# Patient Record
Sex: FEMALE | Race: WHITE | Employment: UNEMPLOYED | URBAN - NONMETROPOLITAN AREA
[De-identification: names, ages, dates, MRNs, and addresses within clinical notes are randomized per-mention and may not be internally consistent; named-entity substitution may affect disease eponyms.]

---

## 2018-02-19 ENCOUNTER — OFFICE VISIT (OUTPATIENT)
Dept: FAMILY MEDICINE CLINIC | Age: 38
End: 2018-02-19
Payer: COMMERCIAL

## 2018-02-19 VITALS
HEIGHT: 67 IN | BODY MASS INDEX: 24.48 KG/M2 | TEMPERATURE: 98.9 F | DIASTOLIC BLOOD PRESSURE: 60 MMHG | SYSTOLIC BLOOD PRESSURE: 110 MMHG | WEIGHT: 156 LBS

## 2018-02-19 DIAGNOSIS — J45.909 ACUTE ASTHMA: Primary | ICD-10-CM

## 2018-02-19 DIAGNOSIS — R68.89 FLU-LIKE SYMPTOMS: ICD-10-CM

## 2018-02-19 LAB
INFLUENZA A ANTIBODY: NEGATIVE
INFLUENZA B ANTIBODY: NEGATIVE

## 2018-02-19 PROCEDURE — 87804 INFLUENZA ASSAY W/OPTIC: CPT | Performed by: FAMILY MEDICINE

## 2018-02-19 PROCEDURE — 99213 OFFICE O/P EST LOW 20 MIN: CPT | Performed by: FAMILY MEDICINE

## 2018-02-19 RX ORDER — ALBUTEROL SULFATE 90 UG/1
2 AEROSOL, METERED RESPIRATORY (INHALATION) EVERY 6 HOURS PRN
Qty: 1 INHALER | Refills: 0 | Status: SHIPPED | OUTPATIENT
Start: 2018-02-19 | End: 2019-10-23 | Stop reason: SDUPTHER

## 2018-02-19 RX ORDER — FLUTICASONE PROPIONATE 50 MCG
SPRAY, SUSPENSION (ML) NASAL
COMMUNITY
Start: 2017-12-12 | End: 2018-12-24 | Stop reason: SDUPTHER

## 2018-02-19 RX ORDER — AMOXICILLIN AND CLAVULANATE POTASSIUM 875; 125 MG/1; MG/1
1 TABLET, FILM COATED ORAL 2 TIMES DAILY
Qty: 20 TABLET | Refills: 0 | Status: SHIPPED | OUTPATIENT
Start: 2018-02-19 | End: 2018-02-19 | Stop reason: CLARIF

## 2018-02-19 RX ORDER — AMOXICILLIN AND CLAVULANATE POTASSIUM 875; 125 MG/1; MG/1
1 TABLET, FILM COATED ORAL 2 TIMES DAILY
Qty: 20 TABLET | Refills: 0 | Status: SHIPPED | OUTPATIENT
Start: 2018-02-19 | End: 2018-03-01

## 2018-02-19 RX ORDER — M-VIT,TX,IRON,MINS/CALC/FOLIC 27MG-0.4MG
1 TABLET ORAL DAILY
COMMUNITY
End: 2020-08-17 | Stop reason: ALTCHOICE

## 2018-02-19 NOTE — PROGRESS NOTES
Vitamins-Minerals (THERAPEUTIC MULTIVITAMIN-MINERALS) tablet Take 1 tablet by mouth daily      CRANBERRY PO Take by mouth      IRON PO Take by mouth      amoxicillin-clavulanate (AUGMENTIN) 875-125 MG per tablet Take 1 tablet by mouth 2 times daily for 10 days 20 tablet 0    albuterol sulfate HFA (PROAIR HFA) 108 (90 Base) MCG/ACT inhaler Inhale 2 puffs into the lungs every 6 hours as needed for Wheezing 1 Inhaler 0     No current facility-administered medications for this visit. Allergies   Allergen Reactions    Bee Venom     Dust Mite Extract     Other      grass         Physical Exam    /60   Temp 98.9 °F (37.2 °C)   Ht 5' 7\" (1.702 m)   Wt 156 lb (70.8 kg)    L/min Comment: 402  BMI 24.43 kg/m²   Wt Readings from Last 3 Encounters:   02/19/18 156 lb (70.8 kg)     BP Readings from Last 3 Encounters:   02/19/18 110/60       General Appearance: well-developed and well nourished and in no acute distress  Skin: warm and dry, no rash or erythema  Eyes: pupils equal, round, and reactive to light  Ears: bilateral tympanic membrane normal  Nose: swollen and obstruction on left with erythema. Throat: 1+ tonsillar size, erythema  Neck: neck supple and non tender without mass, no thyromegaly or thyroid nodules, no cervical lymphadenopathy with the exception of: anterior posterior nodes. Lungs: clear to auscultation bilaterally  Neurologic: alert and oriented, and cooperative for exam.      ASSESSMENT:  1. Acute asthma     2. Flu-like symptoms  POCT Influenza A/B       PLAN:  If her rapid influenza test is negative, I will treat her with an ATB for the sinus findings. I recommend that she cancel her business trip for tomorrow. Her influenza test is negative, I will prescribe augmentin 875 mg twice daily x10 days. The patient is instructed to call the office if she is not improving in a couple days.       Orders Placed This Encounter   Procedures    POCT Influenza A/B     Orders Placed This

## 2018-02-19 NOTE — PATIENT INSTRUCTIONS
PLAN:  If her rapid influenza test is negative, I will treat her with an ATB for the sinus findings. I recommend that she cancel her business trip for tomorrow.

## 2018-07-13 ENCOUNTER — OFFICE VISIT (OUTPATIENT)
Dept: FAMILY MEDICINE CLINIC | Age: 38
End: 2018-07-13
Payer: COMMERCIAL

## 2018-07-13 VITALS
DIASTOLIC BLOOD PRESSURE: 72 MMHG | SYSTOLIC BLOOD PRESSURE: 112 MMHG | BODY MASS INDEX: 23.86 KG/M2 | WEIGHT: 152 LBS | HEIGHT: 67 IN

## 2018-07-13 DIAGNOSIS — L25.9 CONTACT DERMATITIS, UNSPECIFIED CONTACT DERMATITIS TYPE, UNSPECIFIED TRIGGER: Primary | ICD-10-CM

## 2018-07-13 PROCEDURE — 96372 THER/PROPH/DIAG INJ SC/IM: CPT | Performed by: FAMILY MEDICINE

## 2018-07-13 PROCEDURE — 99213 OFFICE O/P EST LOW 20 MIN: CPT | Performed by: FAMILY MEDICINE

## 2018-07-13 RX ORDER — TRIAMCINOLONE ACETONIDE 40 MG/ML
40 INJECTION, SUSPENSION INTRA-ARTICULAR; INTRAMUSCULAR ONCE
Status: COMPLETED | OUTPATIENT
Start: 2018-07-13 | End: 2018-07-13

## 2018-07-13 RX ORDER — LORATADINE 10 MG/1
10 CAPSULE, LIQUID FILLED ORAL DAILY
COMMUNITY

## 2018-07-13 RX ADMIN — TRIAMCINOLONE ACETONIDE 40 MG: 40 INJECTION, SUSPENSION INTRA-ARTICULAR; INTRAMUSCULAR at 16:18

## 2018-07-13 ASSESSMENT — PATIENT HEALTH QUESTIONNAIRE - PHQ9
1. LITTLE INTEREST OR PLEASURE IN DOING THINGS: 0
2. FEELING DOWN, DEPRESSED OR HOPELESS: 0
SUM OF ALL RESPONSES TO PHQ QUESTIONS 1-9: 0
SUM OF ALL RESPONSES TO PHQ9 QUESTIONS 1 & 2: 0

## 2018-07-13 NOTE — PROGRESS NOTES
the lungs every 6 hours as needed for Wheezing 1 Inhaler 0     No current facility-administered medications for this visit. Allergies   Allergen Reactions    Bee Venom     Dust Mite Extract     Other      grass         PHYSICAL EXAM:    /72   Ht 5' 7\" (1.702 m)   Wt 152 lb (68.9 kg)   BMI 23.81 kg/m²   Wt Readings from Last 3 Encounters:   07/13/18 152 lb (68.9 kg)   02/19/18 156 lb (70.8 kg)     BP Readings from Last 3 Encounters:   07/13/18 112/72   02/19/18 110/60       General Appearance: in no acute distress, well developed, well nourished. Eyes: pupils equal, round reactive to light and accommodation. Oral Cavity: mucosa moist.  Neck/Thyroid: neck supple, full range of motion  Skin: warm and dry. Periorbital region left side laterally, papular lesions on temporal, abdomen, and back. Musculoskeletal: normal, full range of motion in knees and hips. Psych: normal affect, speech fluent. ASSESSMENT:   Diagnosis Orders   1. Contact dermatitis, unspecified contact dermatitis type, unspecified trigger  triamcinolone acetonide (KENALOG-40) injection 40 mg    [place] INJECTION,THERAP/PROPH/DIAGNOST, IM OR SUBCUT       PLAN:  This is classic contact dermatitis. I will give her a kenalog injection. This will offer immediate relief for the itching. Orders Placed This Encounter   Procedures    [place] INJECTION,THERAP/PROPH/DIAGNOST, IM OR SUBCUT     Orders Placed This Encounter   Medications    triamcinolone acetonide (KENALOG-40) injection 40 mg         I, Dr. Julienne Salamanca, personally performed the services described in this documentation as scribed by SEAN Dimas in my presence, and it is both accurate and complete.

## 2018-12-24 ENCOUNTER — OFFICE VISIT (OUTPATIENT)
Dept: FAMILY MEDICINE CLINIC | Age: 38
End: 2018-12-24
Payer: COMMERCIAL

## 2018-12-24 VITALS
WEIGHT: 141 LBS | SYSTOLIC BLOOD PRESSURE: 102 MMHG | BODY MASS INDEX: 22.13 KG/M2 | HEIGHT: 67 IN | DIASTOLIC BLOOD PRESSURE: 58 MMHG

## 2018-12-24 DIAGNOSIS — Z00.00 WELL ADULT EXAM: Primary | ICD-10-CM

## 2018-12-24 PROCEDURE — 99395 PREV VISIT EST AGE 18-39: CPT | Performed by: FAMILY MEDICINE

## 2018-12-24 RX ORDER — FLUTICASONE PROPIONATE 50 MCG
1 SPRAY, SUSPENSION (ML) NASAL DAILY
Qty: 1 BOTTLE | Refills: 3 | Status: SHIPPED | OUTPATIENT
Start: 2018-12-24 | End: 2019-10-23 | Stop reason: SDUPTHER

## 2018-12-24 NOTE — PATIENT INSTRUCTIONS
PLAN:  She is taking both a prenatal and multivitamin. There isn't a need to take both of these. I recommend that she just take the one that has folic acid in it since she and her  will be trying to conceive. Her previous cholesterol levels have been great. I don't see a need to re-test this. She looks great. I will sign off on paperwork for her employer and will see her back as needed. No orders of the defined types were placed in this encounter.   I, Dr. Marycarmen Solares, personally performed the services described in this documentation as scribed by SEAN Hancock in my presence, and is both accurate and complete. SURVEY:    You may be receiving a survey from [employer] regarding your visit today. Please complete the survey to enable us to provide the highest quality of care to you and your family. If you cannot score us a very good on any question, please call the office to discuss how we could of made your experience a very good one. Thank you.

## 2018-12-24 NOTE — PROGRESS NOTES
Denies itching. Denies rash. Neurologic: Denies falls. Denies dizziness. Denies fainting. Denies tingling/numbness. Psychiatric: Denies sleep disturbance. Denies anxiety. Denies depressed mood.     Past Surgical History   History reviewed. No pertinent surgical history.        Family History   History reviewed. No pertinent family history.        Past Medical History   History reviewed. No pertinent past medical history. Social History   Substance Use Topics    Smoking status: Never Smoker    Smokeless tobacco: Never Used    Alcohol use Not on file      Current Facility-Administered Medications          Current Outpatient Prescriptions   Medication Sig Dispense Refill    loratadine (CLARITIN) 10 MG capsule Take 10 mg by mouth daily        fluticasone (FLONASE) 50 MCG/ACT nasal spray          Multiple Vitamins-Minerals (THERAPEUTIC MULTIVITAMIN-MINERALS) tablet Take 1 tablet by mouth daily        CRANBERRY PO Take by mouth        IRON PO Take by mouth        albuterol sulfate HFA (PROAIR HFA) 108 (90 Base) MCG/ACT inhaler Inhale 2 puffs into the lungs every 6 hours as needed for Wheezing 1 Inhaler 0      No current facility-administered medications for this visit.                Allergies   Allergen Reactions    Bee Venom      Dust Mite Extract      Other         grass         PHYSICAL EXAM:  Vitals:    12/24/18 0842   BP: (!) 102/58   Weight: 141 lb (64 kg)   Height: 5' 7\" (1.702 m)        Ht 5' 7\" (1.702 m)   BMI 23.81 kg/m²       Wt Readings from Last 3 Encounters:   07/13/18 152 lb (68.9 kg)   02/19/18 156 lb (70.8 kg)          BP Readings from Last 3 Encounters:   07/13/18 112/72   02/19/18 110/60         General Appearance: in no acute distress, well developed, well nourished. Eyes: pupils equal, round reactive to light and accommodation. Ears: normal canal and TM's. Nose: nares patent, no lesions. Oral Cavity: mucosa moist.  Throat: clear.   Neck/Thyroid: neck supple, full range of motion, no cervical lymphadenopathy, no thyromegaly or carotid bruits. Skin: warm and dry. No suspicious lesions. Heart: regular rate and rhythm. No murmurs. S1, S2 normal, no gallops. Lungs: clear to auscultation bilaterally. Abdomen: bowel sounds present, soft, nontender, nondistended, no masses or organomegaly. Musculoskeletal: normal, full range of motion in knees and hips, no swelling or tenderness. Extremities: no cyanosis or edema. Peripheral Pulses: 2+ throughout, symetric. Neurologic: nonfocal, motor strength normal upper and lower extremities, sensory exam intact. Psych: normal affect, speech fluent.     ASSESSMENT:   Diagnosis Orders   1. Well adult exam            PLAN:  She is taking both a prenatal and multivitamin. There isn't a need to take both of these. I recommend that she just take the one that has folic acid in it since she and her  will be trying to conceive. Her previous cholesterol levels have been great. I don't see a need to re-test this. She looks great. I will sign off on paperwork for her employer and will see her back as needed. No orders of the defined types were placed in this encounter.     I, Dr. Brayan Paige, personally performed the services described in this documentation as scribed by SEAN Roland in my presence, and it is both accurate and complete.

## 2018-12-24 NOTE — PROGRESS NOTES
dry skin. Denies itching. Denies rash. Neurologic: Denies falls. Denies dizziness. Denies fainting. Denies tingling/numbness. Psychiatric: Denies sleep disturbance. Denies anxiety. Denies depressed mood. History reviewed. No pertinent surgical history. History reviewed. No pertinent family history. History reviewed. No pertinent past medical history. Social History   Substance Use Topics    Smoking status: Never Smoker    Smokeless tobacco: Never Used    Alcohol use Not on file      Current Outpatient Prescriptions   Medication Sig Dispense Refill    loratadine (CLARITIN) 10 MG capsule Take 10 mg by mouth daily      fluticasone (FLONASE) 50 MCG/ACT nasal spray       Multiple Vitamins-Minerals (THERAPEUTIC MULTIVITAMIN-MINERALS) tablet Take 1 tablet by mouth daily      CRANBERRY PO Take by mouth      IRON PO Take by mouth      albuterol sulfate HFA (PROAIR HFA) 108 (90 Base) MCG/ACT inhaler Inhale 2 puffs into the lungs every 6 hours as needed for Wheezing 1 Inhaler 0     No current facility-administered medications for this visit. Allergies   Allergen Reactions    Bee Venom     Dust Mite Extract     Other      grass       PHYSICAL EXAM:    Ht 5' 7\" (1.702 m)   BMI 23.81 kg/m²   Wt Readings from Last 3 Encounters:   07/13/18 152 lb (68.9 kg)   02/19/18 156 lb (70.8 kg)     BP Readings from Last 3 Encounters:   07/13/18 112/72   02/19/18 110/60       General Appearance: in no acute distress, well developed, well nourished. Eyes: pupils equal, round reactive to light and accommodation. Ears: normal canal and TM's. Nose: nares patent, no lesions. Oral Cavity: mucosa moist.  Throat: clear. Neck/Thyroid: neck supple, full range of motion, no cervical lymphadenopathy, no thyromegaly or carotid bruits. Skin: warm and dry. No suspicious lesions. Heart: regular rate and rhythm. No murmurs. S1, S2 normal, no gallops. Lungs: clear to auscultation bilaterally.   Abdomen: bowel sounds

## 2019-10-23 ENCOUNTER — OFFICE VISIT (OUTPATIENT)
Dept: FAMILY MEDICINE CLINIC | Age: 39
End: 2019-10-23
Payer: COMMERCIAL

## 2019-10-23 VITALS
SYSTOLIC BLOOD PRESSURE: 92 MMHG | HEIGHT: 67 IN | DIASTOLIC BLOOD PRESSURE: 54 MMHG | WEIGHT: 183 LBS | BODY MASS INDEX: 28.72 KG/M2

## 2019-10-23 DIAGNOSIS — Z00.00 ROUTINE GENERAL MEDICAL EXAMINATION AT A HEALTH CARE FACILITY: Primary | ICD-10-CM

## 2019-10-23 DIAGNOSIS — J30.9 ALLERGIC RHINITIS, UNSPECIFIED SEASONALITY, UNSPECIFIED TRIGGER: ICD-10-CM

## 2019-10-23 DIAGNOSIS — J45.20 INTERMITTENT ASTHMA, UNSPECIFIED ASTHMA SEVERITY, UNSPECIFIED WHETHER COMPLICATED: ICD-10-CM

## 2019-10-23 DIAGNOSIS — K21.9 GASTROESOPHAGEAL REFLUX DISEASE, ESOPHAGITIS PRESENCE NOT SPECIFIED: ICD-10-CM

## 2019-10-23 DIAGNOSIS — J45.909 REACTIVE AIRWAY DISEASE WITHOUT COMPLICATION, UNSPECIFIED ASTHMA SEVERITY, UNSPECIFIED WHETHER PERSISTENT: ICD-10-CM

## 2019-10-23 PROCEDURE — 99395 PREV VISIT EST AGE 18-39: CPT | Performed by: FAMILY MEDICINE

## 2019-10-23 RX ORDER — ALBUTEROL SULFATE 90 UG/1
2 AEROSOL, METERED RESPIRATORY (INHALATION) EVERY 6 HOURS PRN
Qty: 1 INHALER | Refills: 0 | Status: SHIPPED | OUTPATIENT
Start: 2019-10-23

## 2019-10-23 RX ORDER — FLUTICASONE PROPIONATE 50 MCG
1 SPRAY, SUSPENSION (ML) NASAL DAILY
Qty: 1 BOTTLE | Refills: 3 | Status: SHIPPED | OUTPATIENT
Start: 2019-10-23 | End: 2020-06-26

## 2019-10-23 RX ORDER — ESOMEPRAZOLE MAGNESIUM 20 MG/1
20 FOR SUSPENSION ORAL DAILY
COMMUNITY
End: 2020-08-17 | Stop reason: ALTCHOICE

## 2019-10-23 ASSESSMENT — PATIENT HEALTH QUESTIONNAIRE - PHQ9
2. FEELING DOWN, DEPRESSED OR HOPELESS: 0
SUM OF ALL RESPONSES TO PHQ QUESTIONS 1-9: 0
SUM OF ALL RESPONSES TO PHQ QUESTIONS 1-9: 0
1. LITTLE INTEREST OR PLEASURE IN DOING THINGS: 0
SUM OF ALL RESPONSES TO PHQ9 QUESTIONS 1 & 2: 0

## 2019-11-22 PROBLEM — Z00.00 ROUTINE GENERAL MEDICAL EXAMINATION AT A HEALTH CARE FACILITY: Status: RESOLVED | Noted: 2019-10-23 | Resolved: 2019-11-22

## 2020-06-26 RX ORDER — FLUTICASONE PROPIONATE 50 MCG
SPRAY, SUSPENSION (ML) NASAL
Qty: 1 BOTTLE | Refills: 2 | Status: SHIPPED | OUTPATIENT
Start: 2020-06-26 | End: 2020-08-17 | Stop reason: SDUPTHER

## 2020-08-17 ENCOUNTER — HOSPITAL ENCOUNTER (OUTPATIENT)
Dept: LAB | Age: 40
Setting detail: SPECIMEN
Discharge: HOME | End: 2020-08-17
Payer: COMMERCIAL

## 2020-08-17 ENCOUNTER — OFFICE VISIT (OUTPATIENT)
Dept: FAMILY MEDICINE CLINIC | Age: 40
End: 2020-08-17
Payer: COMMERCIAL

## 2020-08-17 VITALS
SYSTOLIC BLOOD PRESSURE: 122 MMHG | WEIGHT: 148 LBS | BODY MASS INDEX: 23.23 KG/M2 | TEMPERATURE: 99.2 F | HEIGHT: 67 IN | DIASTOLIC BLOOD PRESSURE: 70 MMHG

## 2020-08-17 PROCEDURE — C9803 HOPD COVID-19 SPEC COLLECT: HCPCS

## 2020-08-17 PROCEDURE — 99213 OFFICE O/P EST LOW 20 MIN: CPT | Performed by: FAMILY MEDICINE

## 2020-08-17 PROCEDURE — U0003 INFECTIOUS AGENT DETECTION BY NUCLEIC ACID (DNA OR RNA); SEVERE ACUTE RESPIRATORY SYNDROME CORONAVIRUS 2 (SARS-COV-2) (CORONAVIRUS DISEASE [COVID-19]), AMPLIFIED PROBE TECHNIQUE, MAKING USE OF HIGH THROUGHPUT TECHNOLOGIES AS DESCRIBED BY CMS-2020-01-R: HCPCS

## 2020-08-17 RX ORDER — FLUTICASONE PROPIONATE 50 MCG
SPRAY, SUSPENSION (ML) NASAL
Qty: 1 BOTTLE | Refills: 2 | Status: SHIPPED | OUTPATIENT
Start: 2020-08-17 | End: 2021-05-18 | Stop reason: SDUPTHER

## 2020-08-17 RX ORDER — AZITHROMYCIN 250 MG/1
250 TABLET, FILM COATED ORAL DAILY
Qty: 1 PACKET | Refills: 0 | Status: SHIPPED | OUTPATIENT
Start: 2020-08-17 | End: 2020-08-22

## 2020-08-17 ASSESSMENT — PATIENT HEALTH QUESTIONNAIRE - PHQ9
2. FEELING DOWN, DEPRESSED OR HOPELESS: 0
SUM OF ALL RESPONSES TO PHQ QUESTIONS 1-9: 0
1. LITTLE INTEREST OR PLEASURE IN DOING THINGS: 0
SUM OF ALL RESPONSES TO PHQ QUESTIONS 1-9: 0
SUM OF ALL RESPONSES TO PHQ9 QUESTIONS 1 & 2: 0

## 2020-08-17 NOTE — PROGRESS NOTES
I, Clement Mortimer, NIVIA, am scribing for and in the presence of Dr. Raul Keene. 8/17/20/1:29 pm/JML      [address] [address]  [place] [facility]  Dept: [phone]    HPI:  Sore throat and cough x 3 days  No fever  Temp now is 99.2 - had cold medicine with a pain reliever in it about 1 hour ago    Current Outpatient Medications   Medication Sig Dispense Refill    azithromycin (ZITHROMAX Z-IKE) 250 MG tablet Take 1 tablet by mouth daily for 5 days As directed per packet instructions. 1 packet 0    fluticasone (FLONASE) 50 MCG/ACT nasal spray SPRAY ONE SPRAY IN EACH NOSTRIL ONCE DAILY 1 Bottle 2    albuterol sulfate HFA (PROAIR HFA) 108 (90 Base) MCG/ACT inhaler Inhale 2 puffs into the lungs every 6 hours as needed for Wheezing 1 Inhaler 0    aluminum chloride (DRYSOL) 20 % external solution Apply topically nightly. 1 Bottle 2    Prenatal Vit-Fe Fumarate-FA (PRENATAL 1+1 PO) Take 1 tablet by mouth daily      loratadine (CLARITIN) 10 MG capsule Take 10 mg by mouth daily      CRANBERRY PO Take by mouth      IRON PO Take by mouth       No current facility-administered medications for this visit. ROS:  Admits cough  Admits sore throat  Denies fever  Denies loss of smell or taste    EXAM:  /70   Temp 99.2 °F (37.3 °C)   Ht 5' 7\" (1.702 m)   Wt 148 lb (67.1 kg)   BMI 23.18 kg/m²   Wt Readings from Last 3 Encounters:   08/17/20 148 lb (67.1 kg)   10/23/19 183 lb (83 kg)   12/24/18 141 lb (64 kg)     BP Readings from Last 3 Encounters:   08/17/20 122/70   10/23/19 (!) 92/54   12/24/18 (!) 102/58     General Appearance: in no acute distress, well developed, well nourished. Eyes: pupils equal, round reactive to light and accommodation. Ears: normal canal and TM's. Nose: nares patent, no lesions. Oral Cavity: mucosa moist.  Throat:  Single exudate on left side  Neck/Thyroid: posterior nodes bilaterally  Skin: warm and dry.  No suspicious lesions. Heart: regular rate and rhythm. No murmurs. S1, S2 normal, no gallops. Lungs: clear to auscultation bilaterally. Abdomen: bowel sounds present, soft, nontender, nondistended, no masses or organomegaly. Musculoskeletal: normal, full range of motion in knees and hips, no swelling or tenderness. Extremities: no cyanosis or edema. Peripheral Pulses: 2+ throughout, symetric. Neurologic: nonfocal, motor strength normal upper and lower extremities, sensory exam intact. Psych: normal affect, speech fluent. ASSESSMENT:   Diagnosis Orders   1. Cough  COVID-19 Ambulatory    azithromycin (ZITHROMAX Z-IKE) 250 MG tablet   2. Sore throat  COVID-19 Ambulatory    azithromycin (ZITHROMAX Z-IKE) 250 MG tablet   3. Allergic rhinitis, unspecified seasonality, unspecified trigger  fluticasone (FLONASE) 50 MCG/ACT nasal spray   4. Viral URI           PLAN:  This is likely viral  It could be COVID, I recommend that she be very cautious around anyone who may be high risk   I will order the COVID testing today  I will also give her a Zpak d/t the exudates I see in her throat    Orders Placed This Encounter   Procedures    COVID-19 Ambulatory     Standing Status:   Future     Number of Occurrences:   1     Standing Expiration Date:   8/17/2021     Scheduling Instructions:      Saline media preferred given current shortage of viral transport media but both acceptable     Order Specific Question:   Is this test for diagnosis or screening? Answer:   Diagnosis of ill patient     Order Specific Question:   Symptomatic for COVID-19 as defined by CDC? Answer:   Yes     Order Specific Question:   Date of Symptom Onset     Answer:   8/15/2020     Order Specific Question:   Hospitalized for COVID-19? Answer:   No     Order Specific Question:   Admitted to ICU for COVID-19? Answer:   No     Order Specific Question:   Employed in healthcare setting?      Answer:   No     Order Specific Question:   Resident in a congregate (group) care setting? Answer:   No     Order Specific Question:   Pregnant? Answer:   No     Order Specific Question:   Previously tested for COVID-19? Answer:   No     Orders Placed This Encounter   Medications    azithromycin (ZITHROMAX Z-IKE) 250 MG tablet     Sig: Take 1 tablet by mouth daily for 5 days As directed per packet instructions. Dispense:  1 packet     Refill:  0    fluticasone (FLONASE) 50 MCG/ACT nasal spray     Sig: SPRAY ONE SPRAY IN EACH NOSTRIL ONCE DAILY     Dispense:  1 Bottle     Refill:  2     I, Dr. Fina Preston, personally performed the services described in this documentation as scribed by IMELDA Wallace in my presence, and is both accurate and complete.

## 2020-08-17 NOTE — PATIENT INSTRUCTIONS
This is likely viral  It could be COVID, I recommend that she be very cautious around anyone who may be high risk   I will order the COVID testing today  I will also give her a Zpak d/t the exudates I see in her throat      SURVEY:    You may be receiving a survey from [employer] regarding your visit today. Please complete the survey to enable us to provide the highest quality of care to you and your family. If you cannot score us a very good on any question, please call the office to discuss how we could of made your experience a very good one. Thank you.

## 2020-08-20 LAB — SARS-COV-2, NAA: NOT DETECTED

## 2020-09-08 ENCOUNTER — OFFICE VISIT (OUTPATIENT)
Dept: FAMILY MEDICINE CLINIC | Age: 40
End: 2020-09-08
Payer: COMMERCIAL

## 2020-09-08 VITALS
SYSTOLIC BLOOD PRESSURE: 105 MMHG | HEIGHT: 67 IN | DIASTOLIC BLOOD PRESSURE: 69 MMHG | BODY MASS INDEX: 21.03 KG/M2 | WEIGHT: 134 LBS

## 2020-09-08 PROCEDURE — 99395 PREV VISIT EST AGE 18-39: CPT | Performed by: NURSE PRACTITIONER

## 2020-09-08 PROCEDURE — 90471 IMMUNIZATION ADMIN: CPT | Performed by: NURSE PRACTITIONER

## 2020-09-08 PROCEDURE — 90732 PPSV23 VACC 2 YRS+ SUBQ/IM: CPT | Performed by: NURSE PRACTITIONER

## 2020-09-08 ASSESSMENT — ENCOUNTER SYMPTOMS
SINUS PAIN: 0
SINUS PRESSURE: 1
CONSTIPATION: 0
CHEST TIGHTNESS: 0
SORE THROAT: 0
RHINORRHEA: 1
COUGH: 1
DIARRHEA: 0
ABDOMINAL PAIN: 0
EYE PAIN: 0
EYE ITCHING: 1
WHEEZING: 0
SHORTNESS OF BREATH: 0

## 2020-09-08 ASSESSMENT — PATIENT HEALTH QUESTIONNAIRE - PHQ9
SUM OF ALL RESPONSES TO PHQ QUESTIONS 1-9: 0
SUM OF ALL RESPONSES TO PHQ9 QUESTIONS 1 & 2: 0
SUM OF ALL RESPONSES TO PHQ QUESTIONS 1-9: 0
1. LITTLE INTEREST OR PLEASURE IN DOING THINGS: 0
2. FEELING DOWN, DEPRESSED OR HOPELESS: 0

## 2020-09-08 NOTE — PATIENT INSTRUCTIONS
Complete labs within the next few days, if possible. Follow up with office regarding biopsy of skin lesion. Recommended flu vaccination in the fall. Continue follow up with gynecology for routine paps.

## 2020-09-08 NOTE — PROGRESS NOTES
Cardiovascular: Negative for chest pain and palpitations. Gastrointestinal: Negative for abdominal pain, constipation and diarrhea. Genitourinary: Negative for difficulty urinating. Musculoskeletal: Negative for arthralgias, joint swelling and myalgias. Skin: Negative for rash. Neurological: Positive for light-headedness (when she does not have water). Negative for dizziness and headaches. Physical Exam:   Vitals:  /69   Ht 5' 7\" (1.702 m)   Wt 134 lb (60.8 kg)   BMI 20.99 kg/m²     Physical Exam  Constitutional:       General: She is not in acute distress. Appearance: Normal appearance. She is normal weight. She is not ill-appearing or toxic-appearing. HENT:      Head: Normocephalic. Right Ear: Tympanic membrane, ear canal and external ear normal.      Left Ear: Tympanic membrane, ear canal and external ear normal.      Nose: Nose normal. No congestion or rhinorrhea. Mouth/Throat:      Pharynx: No oropharyngeal exudate or posterior oropharyngeal erythema. Comments: Left tonsil 2+, right tonsil 1+. No erythema or exudate. Eyes:      General:         Right eye: No discharge. Left eye: No discharge. Extraocular Movements: Extraocular movements intact. Conjunctiva/sclera: Conjunctivae normal.      Pupils: Pupils are equal, round, and reactive to light. Neck:      Musculoskeletal: Neck supple. Cardiovascular:      Rate and Rhythm: Normal rate and regular rhythm. Heart sounds: Normal heart sounds. No murmur. Pulmonary:      Effort: Pulmonary effort is normal. No respiratory distress. Breath sounds: Normal breath sounds. No stridor. No wheezing, rhonchi or rales. Abdominal:      General: Abdomen is flat. Bowel sounds are normal. There is no distension. Palpations: Abdomen is soft. There is no mass. Tenderness: There is no abdominal tenderness. There is no guarding. Hernia: No hernia is present.    Musculoskeletal:      Right Encouraged biopsy to rule out malignancy. Patient will speak with her  and follow up/schedule an appointment for procedure. Completed Refills   Requested Prescriptions      No prescriptions requested or ordered in this encounter       Orders Placed This Encounter   Procedures    Pneumococcal polysaccharide vaccine 23-valent greater than or equal to 3yo subcutaneous/IM    CBC     Standing Status:   Future     Standing Expiration Date:   9/8/2021    Basic Metabolic Panel     Standing Status:   Future     Standing Expiration Date:   9/8/2021    Lipid Panel     Standing Status:   Future     Standing Expiration Date:   9/8/2021     Order Specific Question:   Is Patient Fasting?/# of Hours     Answer:   fasting    Hemoglobin A1C     Standing Status:   Future     Standing Expiration Date:   9/8/2021        No results found for this visit on 09/08/20. Return in about 1 year (around 9/8/2021), or if symptoms worsen or fail to improve, for annual wellness. Labs to be completed now.  .    Electronically signed by OTDD Perez CNP on 09/08/20 at 9:12 PM.

## 2020-09-11 ENCOUNTER — HOSPITAL ENCOUNTER (OUTPATIENT)
Age: 40
Discharge: HOME | End: 2020-09-11
Payer: COMMERCIAL

## 2020-09-11 LAB
ANION GAP SERPL CALCULATED.3IONS-SCNC: 10 MMOL/L (ref 9–17)
BUN BLDV-MCNC: 11 MG/DL (ref 6–20)
BUN/CREAT BLD: 15 (ref 9–20)
CALCIUM SERPL-MCNC: 9.3 MG/DL (ref 8.6–10.4)
CHLORIDE BLD-SCNC: 106 MMOL/L (ref 98–107)
CHOLESTEROL/HDL RATIO: 3
CHOLESTEROL: 236 MG/DL
CO2: 26 MMOL/L (ref 20–31)
CREAT SERPL-MCNC: 0.71 MG/DL (ref 0.5–0.9)
ESTIMATED AVERAGE GLUCOSE: 114 MG/DL
GFR AFRICAN AMERICAN: >60 ML/MIN
GFR NON-AFRICAN AMERICAN: >60 ML/MIN
GFR SERPL CREATININE-BSD FRML MDRD: NORMAL ML/MIN/{1.73_M2}
GFR SERPL CREATININE-BSD FRML MDRD: NORMAL ML/MIN/{1.73_M2}
GLUCOSE BLD-MCNC: 86 MG/DL (ref 70–99)
HBA1C MFR BLD: 5.6 % (ref 4–6)
HCT VFR BLD CALC: 42.4 % (ref 36.3–47.1)
HDLC SERPL-MCNC: 78 MG/DL
HEMOGLOBIN: 13.5 G/DL (ref 11.9–15.1)
LDL CHOLESTEROL: 142 MG/DL (ref 0–130)
MCH RBC QN AUTO: 29.9 PG (ref 25.2–33.5)
MCHC RBC AUTO-ENTMCNC: 31.8 G/DL (ref 28.4–34.8)
MCV RBC AUTO: 93.8 FL (ref 82.6–102.9)
NRBC AUTOMATED: 0 PER 100 WBC
PDW BLD-RTO: 13.4 % (ref 11.8–14.4)
PLATELET # BLD: 240 K/UL (ref 138–453)
PMV BLD AUTO: 11.1 FL (ref 8.1–13.5)
POTASSIUM SERPL-SCNC: 4 MMOL/L (ref 3.7–5.3)
RBC # BLD: 4.52 M/UL (ref 3.95–5.11)
SODIUM BLD-SCNC: 142 MMOL/L (ref 135–144)
TRIGL SERPL-MCNC: 82 MG/DL
VLDLC SERPL CALC-MCNC: ABNORMAL MG/DL (ref 1–30)
WBC # BLD: 5.6 K/UL (ref 3.5–11.3)

## 2020-09-11 PROCEDURE — 80048 BASIC METABOLIC PNL TOTAL CA: CPT

## 2020-09-11 PROCEDURE — 85027 COMPLETE CBC AUTOMATED: CPT

## 2020-09-11 PROCEDURE — 36415 COLL VENOUS BLD VENIPUNCTURE: CPT

## 2020-09-11 PROCEDURE — 83036 HEMOGLOBIN GLYCOSYLATED A1C: CPT

## 2020-09-11 PROCEDURE — 80061 LIPID PANEL: CPT

## 2020-09-14 ENCOUNTER — OFFICE VISIT (OUTPATIENT)
Dept: FAMILY MEDICINE CLINIC | Age: 40
End: 2020-09-14
Payer: COMMERCIAL

## 2020-09-14 ENCOUNTER — HOSPITAL ENCOUNTER (OUTPATIENT)
Age: 40
Setting detail: SPECIMEN
Discharge: HOME | End: 2020-09-14
Payer: COMMERCIAL

## 2020-09-14 PROCEDURE — 88305 TISSUE EXAM BY PATHOLOGIST: CPT

## 2020-09-14 PROCEDURE — 11301 SHAVE SKIN LESION 0.6-1.0 CM: CPT | Performed by: NURSE PRACTITIONER

## 2020-09-14 NOTE — PROGRESS NOTES
[address] [address]  [place] [address]  Dept: [phone]    EXCISIONAL BIOPSY PROCEDURE NOTE    PRE-OP DIAGNOSIS:  Intradermal Nevus. The lesion has been irritated. PROCEDURE:  Skin Lesion Excision(s)    Lesion description: 0.6mm brown/purple papule present on anterior right chest, medial to right axilla. No surrounding erythema. No bleeding or discharge. INDICATIONS:  Christine Aleman is a 42 y.o. female who presents for minor skin surgery for changing and concerning skin lesion(s). The patient understands all risks, benefits, indications, potential complications, and alternatives, and freely consents for the procedure. The patient also understands the option of performing no surgery, the risk for scarring, and the technique of the procedure. TECHNIQUE:  After informed consent was obtained and after the skin was prepped with Betadine and alcohol, 1.5ml Lidocaine with 2% epinephrine was used as local anesthesia. An incision was made with a #10 blade, the lesion was excised and sent for pathologic evaluation. Bleeding was controlled with ferric subsulfate. A dressing was applied and wound care instructions were provided. she tolerated the procedure well and without complications. The patient will be alert for any signs of cutaneous infection and will follow up as instructed. PLAN:  1. Instructed to keep the wound dry and covered for 24-48h and clean thereafter. 2. Warning signs of infection were reviewed.     3. We will call the patient with the biopsy results

## 2020-09-16 LAB — DERMATOLOGY PATHOLOGY REPORT: NORMAL

## 2021-05-18 ENCOUNTER — OFFICE VISIT (OUTPATIENT)
Dept: FAMILY MEDICINE CLINIC | Age: 41
End: 2021-05-18
Payer: COMMERCIAL

## 2021-05-18 VITALS
DIASTOLIC BLOOD PRESSURE: 70 MMHG | BODY MASS INDEX: 21.5 KG/M2 | WEIGHT: 137 LBS | SYSTOLIC BLOOD PRESSURE: 100 MMHG | HEIGHT: 67 IN | OXYGEN SATURATION: 100 % | HEART RATE: 67 BPM

## 2021-05-18 DIAGNOSIS — J30.9 ALLERGIC RHINITIS, UNSPECIFIED SEASONALITY, UNSPECIFIED TRIGGER: ICD-10-CM

## 2021-05-18 DIAGNOSIS — F32.A DEPRESSION, UNSPECIFIED DEPRESSION TYPE: ICD-10-CM

## 2021-05-18 DIAGNOSIS — Z13.31 POSITIVE DEPRESSION SCREENING: ICD-10-CM

## 2021-05-18 DIAGNOSIS — F41.9 ANXIETY: Primary | ICD-10-CM

## 2021-05-18 PROCEDURE — G8431 POS CLIN DEPRES SCRN F/U DOC: HCPCS | Performed by: NURSE PRACTITIONER

## 2021-05-18 PROCEDURE — 99214 OFFICE O/P EST MOD 30 MIN: CPT | Performed by: NURSE PRACTITIONER

## 2021-05-18 RX ORDER — ESCITALOPRAM OXALATE 10 MG/1
10 TABLET ORAL DAILY
Qty: 30 TABLET | Refills: 2 | Status: SHIPPED
Start: 2021-05-18 | End: 2021-06-10 | Stop reason: ALTCHOICE

## 2021-05-18 RX ORDER — FLUTICASONE PROPIONATE 50 MCG
SPRAY, SUSPENSION (ML) NASAL
Qty: 1 BOTTLE | Refills: 5 | Status: SHIPPED | OUTPATIENT
Start: 2021-05-18 | End: 2021-09-17 | Stop reason: SDUPTHER

## 2021-05-18 ASSESSMENT — PATIENT HEALTH QUESTIONNAIRE - PHQ9
SUM OF ALL RESPONSES TO PHQ QUESTIONS 1-9: 13
3. TROUBLE FALLING OR STAYING ASLEEP: 1
5. POOR APPETITE OR OVEREATING: 0
10. IF YOU CHECKED OFF ANY PROBLEMS, HOW DIFFICULT HAVE THESE PROBLEMS MADE IT FOR YOU TO DO YOUR WORK, TAKE CARE OF THINGS AT HOME, OR GET ALONG WITH OTHER PEOPLE: 1
7. TROUBLE CONCENTRATING ON THINGS, SUCH AS READING THE NEWSPAPER OR WATCHING TELEVISION: 1
6. FEELING BAD ABOUT YOURSELF - OR THAT YOU ARE A FAILURE OR HAVE LET YOURSELF OR YOUR FAMILY DOWN: 1
2. FEELING DOWN, DEPRESSED OR HOPELESS: 3

## 2021-05-18 ASSESSMENT — COLUMBIA-SUICIDE SEVERITY RATING SCALE - C-SSRS: 6. HAVE YOU EVER DONE ANYTHING, STARTED TO DO ANYTHING, OR PREPARED TO DO ANYTHING TO END YOUR LIFE?: NO

## 2021-05-18 NOTE — PROGRESS NOTES
Name: El Johnston  : 1980         Chief Complaint:     Chief Complaint   Patient presents with    Anxiety     PATIENT COMES IN FOR ANXIETY. NOTICIED IT AROUND STEPHANIE TIME, SINCE THAN HAS GOTTEN WORSE. History of Present Illness:      El Johnston is a 47 y.o.  female who presents with Anxiety (PATIENT COMES IN FOR ANXIETY. NOTICIED IT AROUND STEPHANIE TIME, SINCE THAN HAS GOTTEN WORSE. )      HPI   Patient presents with concerns of anxiety and depression. She states that symptoms have become more constant since Stephanie time 2020. 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 She admits working within a group of individuals at her job with variable personality dynamics. She states that she is unable to let things go and her mind is running constantly. She also notes symptoms of feeling fidgety and heart pounding. She cut out caffeine 1-1.5 months ago and symptoms have since mildly improved. She does also note interrupted sleep. She states that she gets so anxious while trying to sleep that she has awoken to watch training videos for her job. Her symptoms are a mix of both anxiety and depression. She states she is a positive person to others but is very negative towards herself. She also notes less motivation. She denies thoughts of hurting herself or others. She has not been diagnosed with anxiety or depression in the past.  She denies previous medication use. Past Medical History:     No past medical history on file.    Reviewed all health maintenance requirements and ordered appropriate tests  Health Maintenance Due   Topic Date Due    Hepatitis C screen  Never done    Varicella vaccine (1 of 2 - 2-dose childhood series) Never done    HIV screen  Never done    Cervical cancer screen  Never done       Past Surgical History:     No past surgical history on file. Medications:       Prior to Admission medications    Medication Sig Start Date End Date Taking? Authorizing Provider   escitalopram (LEXAPRO) 10 MG tablet Take 1 tablet by mouth daily 5/18/21  Yes TODD Prater CNP   fluticasone [facility]) 50 MCG/ACT nasal spray SPRAY ONE SPRAY IN EACH NOSTRIL ONCE DAILY 5/18/21  Yes TODD Prater CNP   albuterol sulfate HFA (PROAIR HFA) 108 (90 Base) MCG/ACT inhaler Inhale 2 puffs into the lungs every 6 hours as needed for Wheezing 10/23/19  Yes Marian Alfredo MD   aluminum chloride (DRYSOL) 20 % external solution Apply topically nightly. 10/23/19  Yes Marian Alfredo MD   loratadine (CLARITIN) 10 MG capsule Take 10 mg by mouth daily   Yes Historical Provider, MD   CRANBERRY PO Take by mouth   Yes Historical Provider, MD   IRON PO Take by mouth   Yes Historical Provider, MD   Prenatal Vit-Fe Fumarate-FA (PRENATAL 1+1 PO) Take 1 tablet by mouth daily  Patient not taking: Reported on 5/18/2021    Historical Provider, MD        Allergies:       Bee venom, Dust mite extract, and Other    Social History:     Tobacco:    reports that she has never smoked. She has never used smokeless tobacco.  Alcohol:      has no history on file for alcohol use. Drug Use:  has no history on file for drug use. Family History:     Family History   Problem Relation Age of Onset    High Blood Pressure Mother     High Cholesterol Mother     High Blood Pressure Father     High Cholesterol Sister        Review of Systems:     Positive and Negative as described in HPI    Review of Systems   Psychiatric/Behavioral: Positive for dysphoric mood and sleep disturbance. Negative for suicidal ideas. The patient is nervous/anxious. Physical Exam:   Vitals:  /70   Pulse 67   Ht 5' 7\" (1.702 m)   Wt 137 lb (62.1 kg)   SpO2 100%   BMI 21.46 kg/m²     Physical Exam  Constitutional:       General: She is not in acute distress.      Appearance: Normal initiate counseling. Contact information given for [id number] ([place]). - Patient agreeable to trial medication. Lexapro 10mg initiated. Reviewed side effects. Discussed this may take upwards of 4-6 weeks to see benefit. - Discussed that thoughts of hurting self or others is a medication emergency and she should present to the ED/call 911 if these occur.   - Encouraged deep breathing, yoga, meditation.  - F/u 3 weeks for medcheck or sooner if concerns arise. Completed Refills   Requested Prescriptions     Signed Prescriptions Disp Refills    escitalopram (LEXAPRO) 10 MG tablet 30 tablet 2     Sig: Take 1 tablet by mouth daily    fluticasone (FLONASE) 50 MCG/ACT nasal spray 1 Bottle 5     Sig: SPRAY ONE SPRAY IN EACH NOSTRIL ONCE DAILY       Orders Placed This Encounter   Procedures    Positive Screen for Clinical Depression with a Documented Follow-up Plan         No results found for this visit on 05/18/21. Return in about 3 weeks (around 6/8/2021), or if symptoms worsen or fail to improve, for anxiety medcheck.     Electronically signed by TODD Aguirre CNP on 05/18/21 at 11:21 AM.

## 2021-06-10 ENCOUNTER — OFFICE VISIT (OUTPATIENT)
Dept: FAMILY MEDICINE CLINIC | Age: 41
End: 2021-06-10
Payer: COMMERCIAL

## 2021-06-10 VITALS
OXYGEN SATURATION: 98 % | HEART RATE: 72 BPM | DIASTOLIC BLOOD PRESSURE: 66 MMHG | RESPIRATION RATE: 17 BRPM | BODY MASS INDEX: 20.72 KG/M2 | HEIGHT: 67 IN | SYSTOLIC BLOOD PRESSURE: 104 MMHG | WEIGHT: 132 LBS

## 2021-06-10 DIAGNOSIS — F41.9 ANXIETY: Primary | ICD-10-CM

## 2021-06-10 DIAGNOSIS — D22.9 BENIGN MOLE: ICD-10-CM

## 2021-06-10 PROCEDURE — 99214 OFFICE O/P EST MOD 30 MIN: CPT | Performed by: NURSE PRACTITIONER

## 2021-06-10 RX ORDER — ESCITALOPRAM OXALATE 20 MG/1
20 TABLET ORAL DAILY
Qty: 30 TABLET | Refills: 2 | Status: SHIPPED | OUTPATIENT
Start: 2021-06-10 | End: 2021-09-16

## 2021-06-10 SDOH — ECONOMIC STABILITY: FOOD INSECURITY: WITHIN THE PAST 12 MONTHS, THE FOOD YOU BOUGHT JUST DIDN'T LAST AND YOU DIDN'T HAVE MONEY TO GET MORE.: NEVER TRUE

## 2021-06-10 SDOH — ECONOMIC STABILITY: FOOD INSECURITY: WITHIN THE PAST 12 MONTHS, YOU WORRIED THAT YOUR FOOD WOULD RUN OUT BEFORE YOU GOT MONEY TO BUY MORE.: NEVER TRUE

## 2021-06-10 ASSESSMENT — SOCIAL DETERMINANTS OF HEALTH (SDOH): HOW HARD IS IT FOR YOU TO PAY FOR THE VERY BASICS LIKE FOOD, HOUSING, MEDICAL CARE, AND HEATING?: NOT HARD AT ALL

## 2021-06-10 NOTE — PATIENT INSTRUCTIONS
SURVEY:    You may be receiving a survey from [email or web address] regarding your visit today. Please complete the survey to enable us to provide the highest quality of care to you and your family. If you cannot score us a very good on any question, please call the office to discuss how we could have made your experience a very good one. Thank you.

## 2021-06-10 NOTE — PROGRESS NOTES
Calvin Kumar MD   Prenatal Vit-Fe Fumarate-FA (PRENATAL 1+1 PO) Take 1 tablet by mouth daily    Yes Historical Provider, MD   loratadine (CLARITIN) 10 MG capsule Take 10 mg by mouth daily   Yes Historical Provider, MD   CRANBERRY PO Take by mouth   Yes Historical Provider, MD   IRON PO Take by mouth   Yes Historical Provider, MD        Allergies:       Bee venom, Dust mite extract, and Other    Social History:     Tobacco:    reports that she has never smoked. She has never used smokeless tobacco.  Alcohol:      has no history on file for alcohol use. Drug Use:  has no history on file for drug use. Family History:     Family History   Problem Relation Age of Onset    High Blood Pressure Mother     High Cholesterol Mother     High Blood Pressure Father     High Cholesterol Sister        Review of Systems:     Positive and Negative as described in HPI    Review of Systems   Skin:        Admits concerns with mole over her left scapular area that recently developed a \"tag\" on top within the last week. She states that her 25month-old child frequently pulls at the lesion. Psychiatric/Behavioral: The patient is nervous/anxious. Physical Exam:   Vitals:  /66 (Site: Left Upper Arm, Position: Sitting, Cuff Size: Large Adult)   Pulse 72   Resp 17   Ht 5' 7\" (1.702 m)   Wt 132 lb (59.9 kg)   SpO2 98%   BMI 20.67 kg/m²     Physical Exam  Constitutional:       General: She is not in acute distress. Appearance: Normal appearance. She is normal weight. She is not ill-appearing or toxic-appearing. HENT:      Head: Normocephalic. Cardiovascular:      Rate and Rhythm: Normal rate and regular rhythm. Heart sounds: Normal heart sounds. No murmur heard. Pulmonary:      Effort: Pulmonary effort is normal. No respiratory distress. Breath sounds: Normal breath sounds. No stridor. No wheezing, rhonchi or rales.    Skin:     Comments: Single, raised, brown papule suggestive of mole with overlying scab present left scapula. Border symmetrical.  No red, blue, or black discoloration. No bleeding. Neurological:      Mental Status: She is alert. Psychiatric:         Mood and Affect: Mood normal.         Behavior: Behavior normal.         Thought Content: Thought content normal.         Judgment: Judgment normal.         Data:     Lab Results   Component Value Date     09/11/2020    K 4.0 09/11/2020     09/11/2020    CO2 26 09/11/2020    BUN 11 09/11/2020    CREATININE 0.71 09/11/2020    GLUCOSE 86 09/11/2020    GLUCOSE 89 01/16/2012    LABALBU 4.4 01/16/2012    BILITOT 0.50 01/16/2012    ALKPHOS 43 01/16/2012    AST 24 01/16/2012    ALT 27 01/16/2012     Lab Results   Component Value Date    WBC 5.6 09/11/2020    RBC 4.52 09/11/2020    RBC 4.20 01/16/2012    HGB 13.5 09/11/2020    HCT 42.4 09/11/2020    MCV 93.8 09/11/2020    MCH 29.9 09/11/2020    MCHC 31.8 09/11/2020    RDW 13.4 09/11/2020     09/11/2020     01/16/2012    MPV 11.1 09/11/2020     Lab Results   Component Value Date    TSH 1.50 01/16/2012     Lab Results   Component Value Date    CHOL 236 09/11/2020    HDL 78 09/11/2020    LABA1C 5.6 09/11/2020       Assessment/Plan:      Diagnosis Orders   1. Anxiety     2. Benign mole         Anxiety:   -Discussed options including remaining on 10 mg Lexapro or increasing to 20 mg. Patient prefers to increase to 20 mg dose. Reviewed side effects of Lexapro at length. Rx sent to pharmacy.  -Notify office if symptoms worsen or persist.  -Follow-up in 4 weeks for medication check. Lesion:  -Discussed that the \"tag\" that she is seeing is an overlying scab on the mole, likely occurring from friction injury.  -Reviewed ABCDE characteristic of cancerous lesions. At this time, lesion does not appear cancerous. We will continue to monitor and consider biopsy if needed.     Completed Refills   Requested Prescriptions     Signed Prescriptions Disp Refills    escitalopram

## 2021-07-21 ENCOUNTER — OFFICE VISIT (OUTPATIENT)
Dept: FAMILY MEDICINE CLINIC | Age: 41
End: 2021-07-21
Payer: COMMERCIAL

## 2021-07-21 VITALS
SYSTOLIC BLOOD PRESSURE: 112 MMHG | WEIGHT: 135 LBS | OXYGEN SATURATION: 98 % | HEART RATE: 91 BPM | BODY MASS INDEX: 21.19 KG/M2 | HEIGHT: 67 IN | DIASTOLIC BLOOD PRESSURE: 70 MMHG

## 2021-07-21 DIAGNOSIS — F41.9 ANXIETY: Primary | ICD-10-CM

## 2021-07-21 PROCEDURE — 99213 OFFICE O/P EST LOW 20 MIN: CPT | Performed by: NURSE PRACTITIONER

## 2021-07-21 NOTE — PROGRESS NOTES
Name: Mark Todd  : 1980         Chief Complaint:     Chief Complaint   Patient presents with    Medication Check     Patient states she is doing well on the Lexapro 20mg. denies neg effects. History of Present Illness:      Mark Todd is a 47 y.o.  female who presents with Medication Check (Patient states she is doing well on the Lexapro 20mg. denies neg effects.)      HPI   The patient presents for 4-week follow-up for anxiety. Her Lexapro was increased from 10 mg to 20 mg 4 weeks ago. Today, she states she is \"feeling good\". She notes an improvement in her symptoms with the recent increase in dosage. She states she feels more comfortable. Side effects included previous GI issues that have since resolved. She does note continued anxiousness but her stress level has decreased overall. She admits having mental health resources at work and was given contact information for a local counselor during her last PCP appointment. Past Medical History:     No past medical history on file. Reviewed all health maintenance requirements and ordered appropriate tests  Health Maintenance Due   Topic Date Due    Hepatitis C screen  Never done    HIV screen  Never done    Cervical cancer screen  Never done       Past Surgical History:     No past surgical history on file. Medications:       Prior to Admission medications    Medication Sig Start Date End Date Taking? Authorizing Provider   escitalopram (LEXAPRO) 20 MG tablet Take 1 tablet by mouth daily 6/10/21  Yes TODD Choi - CNP   fluticasone [facility]) 50 MCG/ACT nasal spray SPRAY ONE SPRAY IN EACH NOSTRIL ONCE DAILY 21  Yes TODD Choi CNP   albuterol sulfate HFA (PROAIR HFA) 108 (90 Base) MCG/ACT inhaler Inhale 2 puffs into the lungs every 6 hours as needed for Wheezing 10/23/19  Yes Marina Funes MD   aluminum chloride (DRYSOL) 20 % external solution Apply topically nightly.  10/23/19  Yes Nuzhat Aguillon MD Isabel   loratadine (CLARITIN) 10 MG capsule Take 10 mg by mouth daily   Yes Historical Provider, MD   CRANBERRY PO Take by mouth   Yes Historical Provider, MD   IRON PO Take by mouth   Yes Historical Provider, MD   Prenatal Vit-Fe Fumarate-FA (PRENATAL 1+1 PO) Take 1 tablet by mouth daily   Patient not taking: Reported on 7/21/2021    Historical Provider, MD        Allergies:       Bee venom, Dust mite extract, and Other    Social History:     Tobacco:    reports that she has never smoked. She has never used smokeless tobacco.  Alcohol:      has no history on file for alcohol use. Drug Use:  has no history on file for drug use. Family History:     Family History   Problem Relation Age of Onset    High Blood Pressure Mother     High Cholesterol Mother     High Blood Pressure Father     High Cholesterol Sister        Review of Systems:     Positive and Negative as described in HPI    Review of Systems   Psychiatric/Behavioral: The patient is not nervous/anxious. Physical Exam:   Vitals:  /70   Pulse 91   Ht 5' 7\" (1.702 m)   Wt 135 lb (61.2 kg)   SpO2 98%   BMI 21.14 kg/m²     Physical Exam  Constitutional:       General: She is not in acute distress. Appearance: Normal appearance. She is normal weight. She is not ill-appearing or toxic-appearing. HENT:      Head: Normocephalic. Cardiovascular:      Rate and Rhythm: Normal rate and regular rhythm. Heart sounds: Normal heart sounds. No murmur heard. Pulmonary:      Effort: Pulmonary effort is normal. No respiratory distress. Breath sounds: Normal breath sounds. No stridor. No wheezing, rhonchi or rales. Neurological:      Mental Status: She is alert and oriented to person, place, and time. Psychiatric:         Mood and Affect: Mood normal.         Behavior: Behavior normal.         Thought Content:  Thought content normal.         Judgment: Judgment normal.         Data:     Lab Results   Component Value Date    NA 142 09/11/2020    K 4.0 09/11/2020     09/11/2020    CO2 26 09/11/2020    BUN 11 09/11/2020    CREATININE 0.71 09/11/2020    GLUCOSE 86 09/11/2020    GLUCOSE 89 01/16/2012    LABALBU 4.4 01/16/2012    BILITOT 0.50 01/16/2012    ALKPHOS 43 01/16/2012    AST 24 01/16/2012    ALT 27 01/16/2012     Lab Results   Component Value Date    WBC 5.6 09/11/2020    RBC 4.52 09/11/2020    RBC 4.20 01/16/2012    HGB 13.5 09/11/2020    HCT 42.4 09/11/2020    MCV 93.8 09/11/2020    MCH 29.9 09/11/2020    MCHC 31.8 09/11/2020    RDW 13.4 09/11/2020     09/11/2020     01/16/2012    MPV 11.1 09/11/2020     Lab Results   Component Value Date    TSH 1.50 01/16/2012     Lab Results   Component Value Date    CHOL 236 09/11/2020    HDL 78 09/11/2020    LABA1C 5.6 09/11/2020       Assessment/Plan:      Diagnosis Orders   1. Anxiety         -Patient doing well on Lexapro 20 mg. Continue on this dose.  -Discussed various resources for mental health including counseling.  -Notify office if side effects occur.  -Follow-up in 6 months or sooner if concerns arise. Completed Refills   Requested Prescriptions      No prescriptions requested or ordered in this encounter       No orders of the defined types were placed in this encounter. No results found for this visit on 07/21/21. Return if symptoms worsen or fail to improve.     Electronically signed by TODD Stroud CNP on 07/21/21 at 5:20 PM.

## 2021-07-21 NOTE — PATIENT INSTRUCTIONS
SURVEY:    You may be receiving a survey from [employer] regarding your visit today. Please complete the survey to enable us to provide the highest quality of care to you and your family. If you cannot score us a very good on any question, please call the office to discuss how we could of made your experience a very good one. Thank you.

## 2021-09-16 RX ORDER — ESCITALOPRAM OXALATE 20 MG/1
TABLET ORAL
Qty: 90 TABLET | Refills: 2 | Status: SHIPPED | OUTPATIENT
Start: 2021-09-16 | End: 2022-06-17

## 2021-09-17 ENCOUNTER — OFFICE VISIT (OUTPATIENT)
Dept: FAMILY MEDICINE CLINIC | Age: 41
End: 2021-09-17
Payer: COMMERCIAL

## 2021-09-17 VITALS
DIASTOLIC BLOOD PRESSURE: 62 MMHG | HEART RATE: 68 BPM | HEIGHT: 67 IN | BODY MASS INDEX: 21.82 KG/M2 | OXYGEN SATURATION: 98 % | SYSTOLIC BLOOD PRESSURE: 110 MMHG | WEIGHT: 139 LBS

## 2021-09-17 DIAGNOSIS — L98.9 SKIN LESION OF BACK: ICD-10-CM

## 2021-09-17 DIAGNOSIS — Z00.00 WELLNESS EXAMINATION: Primary | ICD-10-CM

## 2021-09-17 DIAGNOSIS — J30.9 ALLERGIC RHINITIS, UNSPECIFIED SEASONALITY, UNSPECIFIED TRIGGER: ICD-10-CM

## 2021-09-17 DIAGNOSIS — Z13.220 LIPID SCREENING: ICD-10-CM

## 2021-09-17 DIAGNOSIS — Z13.1 DIABETES MELLITUS SCREENING: ICD-10-CM

## 2021-09-17 DIAGNOSIS — Z13.0 SCREENING FOR DEFICIENCY ANEMIA: ICD-10-CM

## 2021-09-17 PROCEDURE — 99213 OFFICE O/P EST LOW 20 MIN: CPT | Performed by: NURSE PRACTITIONER

## 2021-09-17 PROCEDURE — 99395 PREV VISIT EST AGE 18-39: CPT | Performed by: NURSE PRACTITIONER

## 2021-09-17 RX ORDER — FLUTICASONE PROPIONATE 50 MCG
SPRAY, SUSPENSION (ML) NASAL
Qty: 1 EACH | Refills: 5 | Status: SHIPPED | OUTPATIENT
Start: 2021-09-17 | End: 2022-05-06 | Stop reason: SDUPTHER

## 2021-09-17 ASSESSMENT — ENCOUNTER SYMPTOMS
SHORTNESS OF BREATH: 0
ABDOMINAL PAIN: 0
DIARRHEA: 0
COUGH: 0
SORE THROAT: 0
SINUS PAIN: 0
RHINORRHEA: 0
WHEEZING: 0
CONSTIPATION: 0
SINUS PRESSURE: 0

## 2021-09-17 NOTE — PROGRESS NOTES
Name: Radha Chan  : 1980         Chief Complaint:     Chief Complaint   Patient presents with    Annual Exam     no concerns admits headaches (ran into corner of a wall)     Other     pap nextmonth     Anxiety     feels stable on lexapro 20 mg        History of Present Illness:      Radha Chan is a 47 y.o.  female who presents with Annual Exam (no concerns admits headaches (ran into corner of a wall) ), Other (pap nextmonth ), and Anxiety (feels stable on lexapro 20 mg )      HPI     Wellness: The patient admits \"trying\" to attempt well balanced diet. For physical activity, she notes playing with her young children. She admits routine dental exams. She admits routine eye exams. She is fully vaccinated for covid-19. She follows with Dr. Lyric Restrepo (women's health) and is scheduled for pap smear 10/12/21. She denies family history of colorectal cancer. She admits paternal grandmother family history of breast cancer, age unknown. Most recent mammogram never. Anxiety:  Doing well on lexapro 20mg. Admits increased ability to handle stress at work. Past Medical History:     No past medical history on file. Reviewed all health maintenance requirements and ordered appropriate tests  Health Maintenance Due   Topic Date Due    Hepatitis C screen  Never done    HIV screen  Never done    Cervical cancer screen  Never done    Flu vaccine (1) 2021       Past Surgical History:     No past surgical history on file. Medications:       Prior to Admission medications    Medication Sig Start Date End Date Taking?  Authorizing Provider   Probiotic Product (PROBIOTIC DAILY PO) Take by mouth   Yes Historical Provider, MD   fluticasone (FLONASE) 50 MCG/ACT nasal spray SPRAY ONE SPRAY IN EACH NOSTRIL ONCE DAILY 21  Yes TODD Ramirez CNP   escitalopram (LEXAPRO) 20 MG tablet TAKE ONE TABLET BY MOUTH DAILY 21  Yes TODD Ramirez CNP   albuterol sulfate HFA (PROAIR HFA) 108 (90 Base) MCG/ACT inhaler Inhale 2 puffs into the lungs every 6 hours as needed for Wheezing 10/23/19  Yes Meaghan Bolton MD   aluminum chloride (DRYSOL) 20 % external solution Apply topically nightly. 10/23/19  Yes Meaghan Bolton MD   loratadine (CLARITIN) 10 MG capsule Take 10 mg by mouth daily   Yes Historical Provider, MD   CRANBERRY PO Take by mouth   Yes Historical Provider, MD   IRON PO Take by mouth   Yes Historical Provider, MD        Allergies:       Bee venom, Dust mite extract, and Other    Social History:     Tobacco:    reports that she has never smoked. She has never used smokeless tobacco.  Alcohol:      has no history on file for alcohol use. Drug Use:  has no history on file for drug use. Family History:     Family History   Problem Relation Age of Onset    High Blood Pressure Mother     High Cholesterol Mother     High Blood Pressure Father     High Cholesterol Sister        Review of Systems:     Positive and Negative as described in HPI    Review of Systems   Constitutional: Negative for chills, fatigue, fever and unexpected weight change. HENT: Positive for congestion (admits seasonal allergies, takes claritin and flonase). Negative for rhinorrhea, sinus pressure, sinus pain and sore throat. Eyes: Negative for visual disturbance. Respiratory: Negative for cough, shortness of breath and wheezing. Cardiovascular: Negative for chest pain and palpitations. Gastrointestinal: Negative for abdominal pain, constipation and diarrhea. Genitourinary: Negative for difficulty urinating. Musculoskeletal: Negative for arthralgias, joint swelling and myalgias. Skin: Negative for rash. Neurological: Positive for headaches (Admits hitting her head on a wall while playing with her child 5 weeks ago. Admits intermittent headache. Headaches improving. ). Negative for dizziness and light-headedness.        Physical Exam:   Vitals:  /62   Pulse 68   Ht 5' 7\" (1.702 m)   Wt 139 lb (63 kg)   SpO2 98%   BMI 21.77 kg/m²     Physical Exam  Constitutional:       General: She is not in acute distress. Appearance: Normal appearance. She is normal weight. She is not ill-appearing or toxic-appearing. HENT:      Head: Normocephalic. Right Ear: Tympanic membrane, ear canal and external ear normal. There is no impacted cerumen. Left Ear: Tympanic membrane, ear canal and external ear normal. There is no impacted cerumen. Nose: Nose normal. No congestion or rhinorrhea. Mouth/Throat:      Mouth: Mucous membranes are moist.      Pharynx: No oropharyngeal exudate or posterior oropharyngeal erythema. Eyes:      Conjunctiva/sclera: Conjunctivae normal.   Cardiovascular:      Rate and Rhythm: Normal rate and regular rhythm. Heart sounds: Normal heart sounds. No murmur heard. Pulmonary:      Effort: Pulmonary effort is normal. No respiratory distress. Breath sounds: Normal breath sounds. No stridor. No wheezing, rhonchi or rales. Abdominal:      General: Abdomen is flat. Bowel sounds are normal. There is no distension. Palpations: Abdomen is soft. There is no mass. Tenderness: There is no abdominal tenderness. There is no guarding. Hernia: No hernia is present. Musculoskeletal:      Cervical back: Neck supple. Lymphadenopathy:      Cervical: No cervical adenopathy. Skin:     General: Skin is warm. Comments: Brown-colored 5 mm x 7 mm papule on left scapula   Neurological:      Mental Status: She is alert and oriented to person, place, and time. Psychiatric:         Mood and Affect: Mood normal.         Behavior: Behavior normal.         Thought Content:  Thought content normal.         Judgment: Judgment normal.         Data:     Lab Results   Component Value Date     09/11/2020    K 4.0 09/11/2020     09/11/2020    CO2 26 09/11/2020    BUN 11 09/11/2020    CREATININE 0.71 09/11/2020    GLUCOSE 86 09/11/2020    GLUCOSE 89 01/16/2012    LABALBU 4.4 01/16/2012    BILITOT 0.50 01/16/2012    ALKPHOS 43 01/16/2012    AST 24 01/16/2012    ALT 27 01/16/2012     Lab Results   Component Value Date    WBC 5.6 09/11/2020    RBC 4.52 09/11/2020    RBC 4.20 01/16/2012    HGB 13.5 09/11/2020    HCT 42.4 09/11/2020    MCV 93.8 09/11/2020    MCH 29.9 09/11/2020    MCHC 31.8 09/11/2020    RDW 13.4 09/11/2020     09/11/2020     01/16/2012    MPV 11.1 09/11/2020     Lab Results   Component Value Date    TSH 1.50 01/16/2012     Lab Results   Component Value Date    CHOL 236 09/11/2020    HDL 78 09/11/2020    LABA1C 5.6 09/11/2020       Assessment/Plan:      Diagnosis Orders   1. Allergic rhinitis, unspecified seasonality, unspecified trigger  fluticasone (FLONASE) 50 MCG/ACT nasal spray     Left scapular lesion:  -Schedule shave biopsy as lesion has been changing in shape and color    Wellness:  -COVID-19 vaccination completed  -Patient will complete flu vaccine at work  -Counseled on well-balanced diet and routine physical activity  -Reviewed BMI of 21  -Counseled on routine eye and dental exams  -Pap smear scheduled for next month  -Complete wellness labs    Completed Refills   Requested Prescriptions     Signed Prescriptions Disp Refills    fluticasone (FLONASE) 50 MCG/ACT nasal spray 1 each 5     Sig: SPRAY ONE SPRAY IN EACH NOSTRIL ONCE DAILY       No orders of the defined types were placed in this encounter. No results found for this visit on 09/17/21. No follow-ups on file.     Electronically signed by TODD Sevilla CNP on 09/17/21 at 4:08 PM.

## 2021-09-23 ENCOUNTER — HOSPITAL ENCOUNTER (OUTPATIENT)
Age: 41
Discharge: HOME | End: 2021-09-23
Payer: COMMERCIAL

## 2021-09-23 DIAGNOSIS — Z13.1 DIABETES MELLITUS SCREENING: ICD-10-CM

## 2021-09-23 DIAGNOSIS — Z13.220 LIPID SCREENING: ICD-10-CM

## 2021-09-23 DIAGNOSIS — Z13.0 SCREENING FOR DEFICIENCY ANEMIA: ICD-10-CM

## 2021-09-23 DIAGNOSIS — Z00.00 WELLNESS EXAMINATION: ICD-10-CM

## 2021-09-23 LAB
ALT SERPL-CCNC: 20 U/L (ref 5–33)
ANION GAP SERPL CALCULATED.3IONS-SCNC: 11 MMOL/L (ref 9–17)
AST SERPL-CCNC: 19 U/L
BUN BLDV-MCNC: 17 MG/DL (ref 6–20)
BUN/CREAT BLD: 27 (ref 9–20)
CALCIUM SERPL-MCNC: 9.4 MG/DL (ref 8.6–10.4)
CHLORIDE BLD-SCNC: 104 MMOL/L (ref 98–107)
CHOLESTEROL/HDL RATIO: 2.5
CHOLESTEROL: 228 MG/DL
CO2: 26 MMOL/L (ref 20–31)
CREAT SERPL-MCNC: 0.62 MG/DL (ref 0.5–0.9)
ESTIMATED AVERAGE GLUCOSE: 105 MG/DL
GFR AFRICAN AMERICAN: >60 ML/MIN
GFR NON-AFRICAN AMERICAN: >60 ML/MIN
GFR SERPL CREATININE-BSD FRML MDRD: ABNORMAL ML/MIN/{1.73_M2}
GFR SERPL CREATININE-BSD FRML MDRD: ABNORMAL ML/MIN/{1.73_M2}
GLUCOSE BLD-MCNC: 88 MG/DL (ref 70–99)
HBA1C MFR BLD: 5.3 % (ref 4–6)
HCT VFR BLD CALC: 43.3 % (ref 36.3–47.1)
HDLC SERPL-MCNC: 93 MG/DL
HEMOGLOBIN: 13.8 G/DL (ref 11.9–15.1)
LDL CHOLESTEROL: 121 MG/DL (ref 0–130)
MCH RBC QN AUTO: 30.5 PG (ref 25.2–33.5)
MCHC RBC AUTO-ENTMCNC: 31.9 G/DL (ref 28.4–34.8)
MCV RBC AUTO: 95.6 FL (ref 82.6–102.9)
NRBC AUTOMATED: 0 PER 100 WBC
PDW BLD-RTO: 14 % (ref 11.8–14.4)
PLATELET # BLD: 255 K/UL (ref 138–453)
PMV BLD AUTO: 10.5 FL (ref 8.1–13.5)
POTASSIUM SERPL-SCNC: 4.3 MMOL/L (ref 3.7–5.3)
RBC # BLD: 4.53 M/UL (ref 3.95–5.11)
SODIUM BLD-SCNC: 141 MMOL/L (ref 135–144)
TRIGL SERPL-MCNC: 70 MG/DL
VLDLC SERPL CALC-MCNC: ABNORMAL MG/DL (ref 1–30)
WBC # BLD: 6.5 K/UL (ref 3.5–11.3)

## 2021-09-23 PROCEDURE — 83036 HEMOGLOBIN GLYCOSYLATED A1C: CPT

## 2021-09-23 PROCEDURE — 36415 COLL VENOUS BLD VENIPUNCTURE: CPT

## 2021-09-23 PROCEDURE — 84450 TRANSFERASE (AST) (SGOT): CPT

## 2021-09-23 PROCEDURE — 84460 ALANINE AMINO (ALT) (SGPT): CPT

## 2021-09-23 PROCEDURE — 85027 COMPLETE CBC AUTOMATED: CPT

## 2021-09-23 PROCEDURE — 80048 BASIC METABOLIC PNL TOTAL CA: CPT

## 2021-09-23 PROCEDURE — 80061 LIPID PANEL: CPT

## 2021-10-13 ENCOUNTER — HOSPITAL ENCOUNTER (OUTPATIENT)
Age: 41
Setting detail: SPECIMEN
Discharge: HOME | End: 2021-10-13
Payer: COMMERCIAL

## 2021-10-13 ENCOUNTER — PROCEDURE VISIT (OUTPATIENT)
Dept: FAMILY MEDICINE CLINIC | Age: 41
End: 2021-10-13
Payer: COMMERCIAL

## 2021-10-13 DIAGNOSIS — D22.9 ATYPICAL NEVUS: ICD-10-CM

## 2021-10-13 DIAGNOSIS — D22.9 ATYPICAL NEVUS: Primary | ICD-10-CM

## 2021-10-13 PROCEDURE — 11301 SHAVE SKIN LESION 0.6-1.0 CM: CPT | Performed by: NURSE PRACTITIONER

## 2021-10-13 PROCEDURE — 88305 TISSUE EXAM BY PATHOLOGIST: CPT

## 2021-10-13 NOTE — PATIENT INSTRUCTIONS
SURVEY:    You may be receiving a survey from [employer] regarding your visit today. Please complete the survey to enable us to provide the highest quality of care to you and your family. If you cannot score us a very good on any question, please call the office to discuss how we could have made your experience a very good one. Thank you.

## 2021-10-15 LAB — DERMATOLOGY PATHOLOGY REPORT: NORMAL

## 2021-11-23 LAB — PAP SMEAR, EXTERNAL: NEGATIVE

## 2022-05-06 ENCOUNTER — OFFICE VISIT (OUTPATIENT)
Dept: FAMILY MEDICINE CLINIC | Age: 42
End: 2022-05-06
Payer: COMMERCIAL

## 2022-05-06 VITALS
HEART RATE: 94 BPM | BODY MASS INDEX: 23.54 KG/M2 | OXYGEN SATURATION: 99 % | RESPIRATION RATE: 16 BRPM | TEMPERATURE: 97.7 F | DIASTOLIC BLOOD PRESSURE: 70 MMHG | WEIGHT: 150 LBS | HEIGHT: 67 IN | SYSTOLIC BLOOD PRESSURE: 100 MMHG

## 2022-05-06 DIAGNOSIS — J45.21 MILD INTERMITTENT ASTHMA WITH EXACERBATION: Primary | ICD-10-CM

## 2022-05-06 DIAGNOSIS — J06.9 VIRAL URI: ICD-10-CM

## 2022-05-06 DIAGNOSIS — J30.9 ALLERGIC RHINITIS, UNSPECIFIED SEASONALITY, UNSPECIFIED TRIGGER: ICD-10-CM

## 2022-05-06 PROCEDURE — 99213 OFFICE O/P EST LOW 20 MIN: CPT | Performed by: NURSE PRACTITIONER

## 2022-05-06 RX ORDER — FLUTICASONE PROPIONATE 44 UG/1
2 AEROSOL, METERED RESPIRATORY (INHALATION) 2 TIMES DAILY
Qty: 1 EACH | Refills: 1 | Status: SHIPPED | OUTPATIENT
Start: 2022-05-06 | End: 2022-07-06

## 2022-05-06 RX ORDER — BENZONATATE 100 MG/1
100 CAPSULE ORAL 3 TIMES DAILY PRN
Qty: 30 CAPSULE | Refills: 0 | Status: SHIPPED | OUTPATIENT
Start: 2022-05-06 | End: 2022-05-13

## 2022-05-06 RX ORDER — FLUTICASONE PROPIONATE 50 MCG
SPRAY, SUSPENSION (ML) NASAL
Qty: 1 EACH | Refills: 5 | Status: SHIPPED | OUTPATIENT
Start: 2022-05-06

## 2022-05-06 ASSESSMENT — PATIENT HEALTH QUESTIONNAIRE - PHQ9
1. LITTLE INTEREST OR PLEASURE IN DOING THINGS: 0
7. TROUBLE CONCENTRATING ON THINGS, SUCH AS READING THE NEWSPAPER OR WATCHING TELEVISION: 0
SUM OF ALL RESPONSES TO PHQ QUESTIONS 1-9: 0
2. FEELING DOWN, DEPRESSED OR HOPELESS: 0
6. FEELING BAD ABOUT YOURSELF - OR THAT YOU ARE A FAILURE OR HAVE LET YOURSELF OR YOUR FAMILY DOWN: 0
5. POOR APPETITE OR OVEREATING: 0
9. THOUGHTS THAT YOU WOULD BE BETTER OFF DEAD, OR OF HURTING YOURSELF: 0
4. FEELING TIRED OR HAVING LITTLE ENERGY: 0
SUM OF ALL RESPONSES TO PHQ QUESTIONS 1-9: 0
8. MOVING OR SPEAKING SO SLOWLY THAT OTHER PEOPLE COULD HAVE NOTICED. OR THE OPPOSITE, BEING SO FIGETY OR RESTLESS THAT YOU HAVE BEEN MOVING AROUND A LOT MORE THAN USUAL: 0
SUM OF ALL RESPONSES TO PHQ9 QUESTIONS 1 & 2: 0
3. TROUBLE FALLING OR STAYING ASLEEP: 0
SUM OF ALL RESPONSES TO PHQ QUESTIONS 1-9: 0
10. IF YOU CHECKED OFF ANY PROBLEMS, HOW DIFFICULT HAVE THESE PROBLEMS MADE IT FOR YOU TO DO YOUR WORK, TAKE CARE OF THINGS AT HOME, OR GET ALONG WITH OTHER PEOPLE: 0
SUM OF ALL RESPONSES TO PHQ QUESTIONS 1-9: 0

## 2022-05-06 ASSESSMENT — ENCOUNTER SYMPTOMS
SHORTNESS OF BREATH: 0
WHEEZING: 0
SORE THROAT: 1
COUGH: 1

## 2022-05-06 NOTE — PROGRESS NOTES
Name: Hermelinda Glaser  : 1980         Chief Complaint:     Chief Complaint   Patient presents with    Cough     5/2 started with cough. states its improving. worse when she talkes    Congestion     nasal drainge started today       History of Present Illness:      Hermelinda Glaser is a 46 y.o.  female who presents with Cough (/2 started with cough. states its improving. worse when she talkes) and Congestion (nasal drainge started today)      HPI  The patient presents with complaints of coughing that began 22. Nasal symptoms began today, including  sinus pressure and congestion. Cough has been worsening since onset. She states she is sore from coughing. Cough is keeping her up at night. Cough started dry but turned productive. She has been trying alkaselzter with relief. She has not taken her temperature. Denies chills. She has a history of asthma. She has used her albuterol inhaler several times this week, with benefit. Denies wheezing or SOB. Admits sore throat. Denies ear pain. Appetite is normal. Admits some fatigue. Past Medical History:     No past medical history on file. Reviewed all health maintenance requirements and ordered appropriate tests  Health Maintenance Due   Topic Date Due    HIV screen  Never done    Hepatitis C screen  Never done    Cervical cancer screen  Never done    Pneumococcal 0-64 years Vaccine (2 - PCV) 2021    COVID-19 Vaccine (3 - Booster for Pfizer series) 2021    Depression Monitoring  2022       Past Surgical History:     No past surgical history on file. Medications:       Prior to Admission medications    Medication Sig Start Date End Date Taking?  Authorizing Provider   fluticasone (FLONASE) 50 MCG/ACT nasal spray SPRAY ONE SPRAY IN EACH NOSTRIL ONCE DAILY 22  Yes Lisabeth Osgood, APRN - CNP   benzonatate (TESSALON) 100 MG capsule Take 1 capsule by mouth 3 times daily as needed for Cough 22 Yes Sujata Peñaloza TODD Mcpherson CNP   fluticasone (FLOVENT HFA) 44 MCG/ACT inhaler Inhale 2 puffs into the lungs 2 times daily 5/6/22  Yes TODD Shipley CNP   Probiotic Product (PROBIOTIC DAILY PO) Take by mouth   Yes Historical Provider, MD   escitalopram (LEXAPRO) 20 MG tablet TAKE ONE TABLET BY MOUTH DAILY 9/16/21  Yes TODD Shipley CNP   albuterol sulfate HFA (PROAIR HFA) 108 (90 Base) MCG/ACT inhaler Inhale 2 puffs into the lungs every 6 hours as needed for Wheezing 10/23/19  Yes Edil Vigil MD   aluminum chloride (DRYSOL) 20 % external solution Apply topically nightly. 10/23/19  Yes Edil Vigil MD   loratadine (CLARITIN) 10 MG capsule Take 10 mg by mouth daily   Yes Historical Provider, MD   CRANBERRY PO Take by mouth   Yes Historical Provider, MD   IRON PO Take by mouth   Yes Historical Provider, MD        Allergies:       Bee venom, Dust mite extract, and Other    Social History:     Tobacco:    reports that she has never smoked. She has never used smokeless tobacco.  Alcohol:      has no history on file for alcohol use. Drug Use:  has no history on file for drug use. Family History:     Family History   Problem Relation Age of Onset    High Blood Pressure Mother     High Cholesterol Mother     High Blood Pressure Father     High Cholesterol Sister        Review of Systems:     Positive and Negative as described in HPI    Review of Systems   Constitutional: Negative for chills and fever. HENT: Positive for congestion and sore throat. Negative for ear pain. Respiratory: Positive for cough. Negative for shortness of breath and wheezing. Physical Exam:   Vitals:  /70   Pulse 94   Temp 97.7 °F (36.5 °C)   Resp 16   Ht 5' 7\" (1.702 m)   Wt 150 lb (68 kg)   SpO2 99%    L/min Comment: 387  BMI 23.49 kg/m²     Physical Exam  Constitutional:       General: She is not in acute distress. Appearance: Normal appearance. She is normal weight.  She is not ill-appearing or toxic-appearing. HENT:      Head: Normocephalic. Right Ear: Tympanic membrane, ear canal and external ear normal. There is no impacted cerumen. Left Ear: Tympanic membrane, ear canal and external ear normal. There is no impacted cerumen. Nose: Nose normal. No congestion or rhinorrhea. Mouth/Throat:      Mouth: Mucous membranes are moist.      Pharynx: No oropharyngeal exudate or posterior oropharyngeal erythema. Cardiovascular:      Rate and Rhythm: Normal rate and regular rhythm. Heart sounds: Normal heart sounds. No murmur heard. Pulmonary:      Effort: Pulmonary effort is normal. No respiratory distress. Breath sounds: Normal breath sounds. No stridor. No wheezing, rhonchi or rales. Lymphadenopathy:      Cervical: No cervical adenopathy. Skin:     General: Skin is warm. Neurological:      Mental Status: She is alert and oriented to person, place, and time. Psychiatric:         Mood and Affect: Mood normal.         Behavior: Behavior normal.         Thought Content:  Thought content normal.         Judgment: Judgment normal.         Data:     Lab Results   Component Value Date     09/23/2021    K 4.3 09/23/2021     09/23/2021    CO2 26 09/23/2021    BUN 17 09/23/2021    CREATININE 0.62 09/23/2021    GLUCOSE 88 09/23/2021    GLUCOSE 89 01/16/2012    LABALBU 4.4 01/16/2012    BILITOT 0.50 01/16/2012    ALKPHOS 43 01/16/2012    AST 19 09/23/2021    ALT 20 09/23/2021     Lab Results   Component Value Date    WBC 6.5 09/23/2021    RBC 4.53 09/23/2021    RBC 4.20 01/16/2012    HGB 13.8 09/23/2021    HCT 43.3 09/23/2021    MCV 95.6 09/23/2021    MCH 30.5 09/23/2021    MCHC 31.9 09/23/2021    RDW 14.0 09/23/2021     09/23/2021     01/16/2012    MPV 10.5 09/23/2021     Lab Results   Component Value Date    TSH 1.50 01/16/2012     Lab Results   Component Value Date    CHOL 228 09/23/2021    HDL 93 09/23/2021    LABA1C 5.3 09/23/2021 Assessment/Plan:      Diagnosis Orders   1. Mild intermittent asthma with exacerbation     2. Viral URI     3. Allergic rhinitis, unspecified seasonality, unspecified trigger  fluticasone (FLONASE) 50 MCG/ACT nasal spray     - Peak flow abnormal as noted above  - I suspect her cough is caused by reactive airway disease, worsening in her asthma  - Add Flovent twice daily  - Continue albuterol as needed  - Add Tessalon Perles twice daily as needed  - Encouraged rest, hydration, humidifier, warm tea with honey  - If symptoms worsen or persist notify office. If her symptoms persist into next week, she is to notify office as an antibiotic may be required at this time    Completed Refills   Requested Prescriptions     Signed Prescriptions Disp Refills    fluticasone (FLONASE) 50 MCG/ACT nasal spray 1 each 5     Sig: SPRAY ONE SPRAY IN EACH NOSTRIL ONCE DAILY    benzonatate (TESSALON) 100 MG capsule 30 capsule 0     Sig: Take 1 capsule by mouth 3 times daily as needed for Cough    fluticasone (FLOVENT HFA) 44 MCG/ACT inhaler 1 each 1     Sig: Inhale 2 puffs into the lungs 2 times daily       No orders of the defined types were placed in this encounter. No results found for this visit on 05/06/22. Return if symptoms worsen or fail to improve.     Electronically signed by TODD Salinas CNP on 05/06/22 at 2:24 PM.

## 2022-05-06 NOTE — PATIENT INSTRUCTIONS
SURVEY:    You may be receiving a survey from [employer] regarding your visit today. Please complete the survey to enable us to provide the highest quality of care to you and your family. If you cannot score us a very good on any question, please call the office to discuss how we could of made your experience a very good one. Thank you.

## 2022-06-17 RX ORDER — ESCITALOPRAM OXALATE 20 MG/1
TABLET ORAL
Qty: 90 TABLET | Refills: 3 | Status: SHIPPED | OUTPATIENT
Start: 2022-06-17

## 2022-06-30 ENCOUNTER — TELEMEDICINE (OUTPATIENT)
Dept: PRIMARY CARE CLINIC | Age: 42
End: 2022-06-30
Payer: COMMERCIAL

## 2022-06-30 DIAGNOSIS — J01.10 ACUTE NON-RECURRENT FRONTAL SINUSITIS: Primary | ICD-10-CM

## 2022-06-30 PROCEDURE — 99213 OFFICE O/P EST LOW 20 MIN: CPT | Performed by: FAMILY MEDICINE

## 2022-06-30 RX ORDER — AMOXICILLIN 500 MG/1
500 CAPSULE ORAL 3 TIMES DAILY
Qty: 30 CAPSULE | Refills: 0 | Status: SHIPPED | OUTPATIENT
Start: 2022-06-30 | End: 2022-07-10

## 2022-06-30 NOTE — PROGRESS NOTES
Patient presents today with congestion and green mucus. Has sinus symptoms for 1 wk  No fever  She has been using otc meds without relief  Has cough with expectoration- yellow  Allergies:  Bee venom, Dust mite extract, and Other    Past Medical History:    Past Medical History:   Diagnosis Date    Exercise-induced asthma        Past Surgical History:    No past surgical history on file. Social History:   Social History     Tobacco Use    Smoking status: Never Smoker    Smokeless tobacco: Never Used   Substance Use Topics    Alcohol use: Not on file       Family History:   Family History   Problem Relation Age of Onset    High Blood Pressure Mother     High Cholesterol Mother     High Blood Pressure Father     High Cholesterol Sister          Review of Systems:  Constitutional: negative for fevers or chills, has frontal headache  Eyes: negative for visual disturbance   ENT: negative for sore throat ,positive fotr nasal congestion  Respiratory: positive for cough,negative for shortness of breath and has sputum  Cardiovascular: negative for chest pain or palpitations  Gastrointestinal: negative for abd pain, nausea, vomiting, diarrhea or constipation  Musculoskeletal:negative for arthralgias, muscle weakness and stiff joints   Integument/breast: negative for skin rash or lesions  Neurological: negative for unilateral weakness, numbness or tingling. Objective: There were no vitals taken for this visit. GEN:  She is alert and oriented. no distress  PULM:  no cyanosis or respiratory distress      Assessment:  1.  Acute non-recurrent frontal sinusitis                Plan:  · Encouraged increased oral fluids  · May use OTC meds:    Tylenol 500 mg po q6 hrs PRN fever   Mucinex-DM po BID prn cough  Continue albuterol prn  Call if short of breath or symptoms worsen    Orders Placed This Encounter   Medications    amoxicillin (AMOXIL) 500 MG capsule     Sig: Take 1 capsule by mouth 3 times daily for 10 days Dispense:  30 capsule     Refill:  0     No orders of the defined types were placed in this encounter. No follow-ups on file.   Total time in evaluating and formulating treatment plan 25 min    Electronically signed by Jane Paiz MD on 6/30/2022 at 8:12 AM

## 2022-07-06 RX ORDER — FLUTICASONE PROPIONATE 44 MCG
AEROSOL WITH ADAPTER (GRAM) INHALATION
Qty: 10.6 G | Refills: 0 | Status: SHIPPED | OUTPATIENT
Start: 2022-07-06

## 2022-11-02 ENCOUNTER — OFFICE VISIT (OUTPATIENT)
Dept: PRIMARY CARE CLINIC | Age: 42
End: 2022-11-02
Payer: COMMERCIAL

## 2022-11-02 VITALS
HEIGHT: 67 IN | BODY MASS INDEX: 24.17 KG/M2 | OXYGEN SATURATION: 99 % | WEIGHT: 154 LBS | TEMPERATURE: 97.5 F | SYSTOLIC BLOOD PRESSURE: 124 MMHG | HEART RATE: 72 BPM | DIASTOLIC BLOOD PRESSURE: 78 MMHG | RESPIRATION RATE: 14 BRPM

## 2022-11-02 DIAGNOSIS — J32.9 SINUSITIS, UNSPECIFIED CHRONICITY, UNSPECIFIED LOCATION: Primary | ICD-10-CM

## 2022-11-02 PROCEDURE — 99213 OFFICE O/P EST LOW 20 MIN: CPT | Performed by: NURSE PRACTITIONER

## 2022-11-02 RX ORDER — AMOXICILLIN AND CLAVULANATE POTASSIUM 875; 125 MG/1; MG/1
1 TABLET, FILM COATED ORAL 2 TIMES DAILY
Qty: 14 TABLET | Refills: 0 | Status: SHIPPED | OUTPATIENT
Start: 2022-11-02 | End: 2022-11-09

## 2022-11-02 SDOH — ECONOMIC STABILITY: FOOD INSECURITY: WITHIN THE PAST 12 MONTHS, YOU WORRIED THAT YOUR FOOD WOULD RUN OUT BEFORE YOU GOT MONEY TO BUY MORE.: NEVER TRUE

## 2022-11-02 SDOH — ECONOMIC STABILITY: FOOD INSECURITY: WITHIN THE PAST 12 MONTHS, THE FOOD YOU BOUGHT JUST DIDN'T LAST AND YOU DIDN'T HAVE MONEY TO GET MORE.: NEVER TRUE

## 2022-11-02 ASSESSMENT — SOCIAL DETERMINANTS OF HEALTH (SDOH): HOW HARD IS IT FOR YOU TO PAY FOR THE VERY BASICS LIKE FOOD, HOUSING, MEDICAL CARE, AND HEATING?: NOT HARD AT ALL

## 2022-11-02 ASSESSMENT — ENCOUNTER SYMPTOMS
VOMITING: 0
DIARRHEA: 0
SORE THROAT: 0
RHINORRHEA: 1
NAUSEA: 0

## 2022-11-02 NOTE — PROGRESS NOTES
Name: Norberto Steinberg  : 1980         Chief Complaint:     Chief Complaint   Patient presents with    Congestion     Started week and half ago. Stuffy. Denies runny nose. Taking otc meds with no help. History of Present Illness:      Norberto Steinberg is a 46 y.o.  female who presents with Congestion (Started week and half ago. Stuffy. Denies runny nose. Taking otc meds with no help. )      HPI    The patient presents with complaints of nasal congestion and nasal drainage x1.5 -2 weeks. Nasal drainage is yellow. Admits mild sinus pressure and headache. Denies sore throat. Denies nausea, vomiting, or diarrhea. She completed home covid test last week and results negative. Sick contacts include sons who had to be put on antibiotics. She has tried jl seltzer pills, mucinex sinus max with benefit. She is taking flonase and claritin. Since onset, symptoms worsened and are persistent. She is in a wedding this weekend. Past Medical History:     Past Medical History:   Diagnosis Date    Exercise-induced asthma       Reviewed all health maintenance requirements and ordered appropriate tests  Health Maintenance Due   Topic Date Due    Varicella vaccine (1 of 2 - 2-dose childhood series) Never done    HIV screen  Never done    Hepatitis C screen  Never done    Cervical cancer screen  Never done    COVID-19 Vaccine (3 - Booster for Frazier Peter series) 2021    Flu vaccine (1) 2022       Past Surgical History:     No past surgical history on file. Medications:       Prior to Admission medications    Medication Sig Start Date End Date Taking?  Authorizing Provider   amoxicillin-clavulanate (AUGMENTIN) 875-125 MG per tablet Take 1 tablet by mouth 2 times daily for 7 days 22 Yes Cheryal Osler, APRN - CNP   FLOVENT HFA 44 MCG/ACT inhaler INHALE TWO PUFFS BY MOUTH TWICE A DAY 22  Yes Cheryal Osler, APRN - CNP   escitalopram (LEXAPRO) 20 MG tablet TAKE ONE TABLET BY MOUTH DAILY 6/17/22  Yes TODD Calderon CNP   aluminum chloride (DRYSOL) 20 % external solution Apply once daily at bedtime; once excessive sweating has stopped, may decrease to once or twice weekly, or as needed. Wash treated area in the morning. 6/15/22  Yes TODD Calderon CNP   fluticasone [facility]) 50 MCG/ACT nasal spray SPRAY ONE SPRAY IN EACH NOSTRIL ONCE DAILY 5/6/22  Yes TODD Calderon CNP   Probiotic Product (PROBIOTIC DAILY PO) Take by mouth   Yes Historical Provider, MD   albuterol sulfate HFA (PROAIR HFA) 108 (90 Base) MCG/ACT inhaler Inhale 2 puffs into the lungs every 6 hours as needed for Wheezing 10/23/19  Yes Alex Tang MD   loratadine (CLARITIN) 10 MG capsule Take 10 mg by mouth daily   Yes Historical Provider, MD   CRANBERRY PO Take by mouth   Yes Historical Provider, MD   IRON PO Take by mouth   Yes Historical Provider, MD        Allergies:       Bee venom, Dust mite extract, and Other    Social History:     Tobacco:    reports that she has never smoked. She has never used smokeless tobacco.  Alcohol:      has no history on file for alcohol use. Drug Use:  has no history on file for drug use. Family History:     Family History   Problem Relation Age of Onset    High Blood Pressure Mother     High Cholesterol Mother     High Blood Pressure Father     High Cholesterol Sister        Review of Systems:     Positive and Negative as described in HPI    Review of Systems   Constitutional:  Negative for fever. HENT:  Positive for congestion and rhinorrhea. Negative for sore throat. Gastrointestinal:  Negative for diarrhea, nausea and vomiting. Physical Exam:   Vitals:  /78   Pulse 72   Temp 97.5 °F (36.4 °C)   Resp 14   Ht 5' 7\" (1.702 m)   Wt 154 lb (69.9 kg)   SpO2 99%   BMI 24.12 kg/m²     Physical Exam  Constitutional:       General: She is not in acute distress. Appearance: Normal appearance. She is normal weight.  She is not ill-appearing or toxic-appearing. HENT:      Head: Normocephalic. Right Ear: Tympanic membrane, ear canal and external ear normal. There is no impacted cerumen. Left Ear: Tympanic membrane, ear canal and external ear normal. There is no impacted cerumen. Nose: Congestion and rhinorrhea present. Mouth/Throat:      Mouth: Mucous membranes are moist.      Pharynx: Posterior oropharyngeal erythema present. No oropharyngeal exudate. Comments: +PND  Cardiovascular:      Rate and Rhythm: Normal rate and regular rhythm. Heart sounds: Normal heart sounds. No murmur heard. Pulmonary:      Effort: Pulmonary effort is normal. No respiratory distress. Breath sounds: Normal breath sounds. No stridor. No wheezing, rhonchi or rales. Musculoskeletal:      Cervical back: Neck supple. Lymphadenopathy:      Cervical: No cervical adenopathy. Neurological:      Mental Status: She is alert. Psychiatric:         Mood and Affect: Mood normal.         Behavior: Behavior normal.         Thought Content:  Thought content normal.         Judgment: Judgment normal.       Data:     Lab Results   Component Value Date/Time     09/23/2021 07:13 AM    K 4.3 09/23/2021 07:13 AM     09/23/2021 07:13 AM    CO2 26 09/23/2021 07:13 AM    BUN 17 09/23/2021 07:13 AM    CREATININE 0.62 09/23/2021 07:13 AM    GLUCOSE 88 09/23/2021 07:13 AM    GLUCOSE 89 01/16/2012 11:40 AM    LABALBU 4.4 01/16/2012 11:40 AM    BILITOT 0.50 01/16/2012 11:40 AM    ALKPHOS 43 01/16/2012 11:40 AM    AST 19 09/23/2021 07:13 AM    ALT 20 09/23/2021 07:13 AM     Lab Results   Component Value Date/Time    WBC 6.5 09/23/2021 07:13 AM    RBC 4.53 09/23/2021 07:13 AM    RBC 4.20 01/16/2012 11:40 AM    HGB 13.8 09/23/2021 07:13 AM    HCT 43.3 09/23/2021 07:13 AM    MCV 95.6 09/23/2021 07:13 AM    MCH 30.5 09/23/2021 07:13 AM    MCHC 31.9 09/23/2021 07:13 AM    RDW 14.0 09/23/2021 07:13 AM     09/23/2021 07:13 AM     01/16/2012 11:40 AM    MPV 10.5 09/23/2021 07:13 AM     Lab Results   Component Value Date/Time    TSH 1.50 01/16/2012 11:40 AM     Lab Results   Component Value Date/Time    CHOL 228 09/23/2021 07:13 AM    HDL 93 09/23/2021 07:13 AM    LABA1C 5.3 09/23/2021 07:13 AM       Assessment/Plan:      Diagnosis Orders   1. Sinusitis, unspecified chronicity, unspecified location          - Encouraged rest and hydration  - Encourage warm tea with honey, humidifier use  - Continue OTC medications for symptom management. Continue Claritin and Flonase  - Rx Augmentin twice daily x7 days prescribed  - Notify office if symptoms worsen or persist    Completed Refills   Requested Prescriptions     Signed Prescriptions Disp Refills    amoxicillin-clavulanate (AUGMENTIN) 875-125 MG per tablet 14 tablet 0     Sig: Take 1 tablet by mouth 2 times daily for 7 days       No orders of the defined types were placed in this encounter. No results found for this visit on 11/02/22. Return if symptoms worsen or fail to improve.     Electronically signed by TODD Koo CNP on 11/02/22 at 4:32 PM.

## 2022-11-02 NOTE — PATIENT INSTRUCTIONS
SURVEY:    You may be receiving a survey from [employer] regarding your visit today. Please complete the survey to enable us to provide the highest quality of care to you and your family. If you cannot score us a very good on any question, please call the office to discuss how we could of made your experience a very good one. Thank you.

## 2023-02-14 DIAGNOSIS — J30.9 ALLERGIC RHINITIS, UNSPECIFIED SEASONALITY, UNSPECIFIED TRIGGER: ICD-10-CM

## 2023-02-14 RX ORDER — ESCITALOPRAM OXALATE 20 MG/1
TABLET ORAL
Qty: 90 TABLET | Refills: 3 | Status: SHIPPED | OUTPATIENT
Start: 2023-02-14

## 2023-02-14 RX ORDER — FLUTICASONE PROPIONATE 50 MCG
SPRAY, SUSPENSION (ML) NASAL
Qty: 3 EACH | Refills: 3 | Status: SHIPPED | OUTPATIENT
Start: 2023-02-14

## 2023-03-16 LAB — MAMMOGRAPHY, EXTERNAL: NORMAL

## 2023-05-03 RX ORDER — ESCITALOPRAM OXALATE 20 MG/1
TABLET ORAL
Qty: 90 TABLET | Refills: 3 | OUTPATIENT
Start: 2023-05-03

## 2023-06-06 ENCOUNTER — HOSPITAL ENCOUNTER (EMERGENCY)
Age: 43
Discharge: HOME | End: 2023-06-06
Attending: EMERGENCY MEDICINE
Payer: COMMERCIAL

## 2023-06-06 ENCOUNTER — APPOINTMENT (OUTPATIENT)
Dept: CT IMAGING | Age: 43
End: 2023-06-06
Payer: COMMERCIAL

## 2023-06-06 VITALS
TEMPERATURE: 98.4 F | WEIGHT: 165 LBS | HEART RATE: 66 BPM | RESPIRATION RATE: 20 BRPM | SYSTOLIC BLOOD PRESSURE: 99 MMHG | BODY MASS INDEX: 25.84 KG/M2 | DIASTOLIC BLOOD PRESSURE: 53 MMHG | OXYGEN SATURATION: 98 %

## 2023-06-06 DIAGNOSIS — R10.9 LEFT FLANK PAIN: Primary | ICD-10-CM

## 2023-06-06 DIAGNOSIS — N83.202 LEFT OVARIAN CYST: ICD-10-CM

## 2023-06-06 DIAGNOSIS — N83.201 CYSTS OF BOTH OVARIES: ICD-10-CM

## 2023-06-06 DIAGNOSIS — N83.202 CYSTS OF BOTH OVARIES: ICD-10-CM

## 2023-06-06 LAB
ANION GAP SERPL CALCULATED.3IONS-SCNC: 16 MMOL/L (ref 9–17)
BACTERIA URNS QL MICRO: ABNORMAL
BILIRUB UR QL STRIP: NEGATIVE
BUN SERPL-MCNC: 17 MG/DL (ref 6–20)
BUN/CREAT SERPL: 24 (ref 9–20)
CALCIUM SERPL-MCNC: 9.6 MG/DL (ref 8.6–10.4)
CHLORIDE SERPL-SCNC: 99 MMOL/L (ref 98–107)
CLARITY UR: CLEAR
CO2 SERPL-SCNC: 20 MMOL/L (ref 20–31)
COLOR UR: YELLOW
CREAT SERPL-MCNC: 0.72 MG/DL (ref 0.5–0.9)
EPI CELLS #/AREA URNS HPF: ABNORMAL /HPF (ref 0–25)
ERYTHROCYTE [DISTWIDTH] IN BLOOD BY AUTOMATED COUNT: 14.2 % (ref 11.8–14.4)
GFR SERPL CREATININE-BSD FRML MDRD: >60 ML/MIN/1.73M2
GLUCOSE SERPL-MCNC: 127 MG/DL (ref 70–99)
GLUCOSE UR STRIP-MCNC: NEGATIVE MG/DL
HCG SERPL QL: NEGATIVE
HCT VFR BLD AUTO: 40.1 % (ref 36.3–47.1)
HGB BLD-MCNC: 13.5 G/DL (ref 11.9–15.1)
HGB UR QL STRIP.AUTO: NEGATIVE
KETONES UR STRIP-MCNC: ABNORMAL MG/DL
LEUKOCYTE ESTERASE UR QL STRIP: NEGATIVE
MCH RBC QN AUTO: 30.3 PG (ref 25.2–33.5)
MCHC RBC AUTO-ENTMCNC: 33.7 G/DL (ref 28.4–34.8)
MCV RBC AUTO: 90.1 FL (ref 82.6–102.9)
NITRITE UR QL STRIP: NEGATIVE
NRBC AUTOMATED: 0 PER 100 WBC
PH UR STRIP: 6.5 [PH] (ref 5–9)
PLATELET # BLD AUTO: 221 K/UL (ref 138–453)
PMV BLD AUTO: 11.2 FL (ref 8.1–13.5)
POTASSIUM SERPL-SCNC: 3.8 MMOL/L (ref 3.7–5.3)
PROT UR STRIP-MCNC: NEGATIVE MG/DL
RBC # BLD AUTO: 4.45 M/UL (ref 3.95–5.11)
RBC #/AREA URNS HPF: ABNORMAL /HPF (ref 0–2)
SODIUM SERPL-SCNC: 135 MMOL/L (ref 135–144)
SP GR UR STRIP: 1.01 (ref 1.01–1.02)
UROBILINOGEN UR STRIP-ACNC: NORMAL
WBC #/AREA URNS HPF: ABNORMAL /HPF (ref 0–5)
WBC OTHER # BLD: 13.8 K/UL (ref 3.5–11.3)

## 2023-06-06 PROCEDURE — 81001 URINALYSIS AUTO W/SCOPE: CPT

## 2023-06-06 PROCEDURE — 80048 BASIC METABOLIC PNL TOTAL CA: CPT

## 2023-06-06 PROCEDURE — 74176 CT ABD & PELVIS W/O CONTRAST: CPT

## 2023-06-06 PROCEDURE — 84703 CHORIONIC GONADOTROPIN ASSAY: CPT

## 2023-06-06 PROCEDURE — 99284 EMERGENCY DEPT VISIT MOD MDM: CPT

## 2023-06-06 PROCEDURE — 96375 TX/PRO/DX INJ NEW DRUG ADDON: CPT

## 2023-06-06 PROCEDURE — 96374 THER/PROPH/DIAG INJ IV PUSH: CPT

## 2023-06-06 PROCEDURE — 6360000002 HC RX W HCPCS: Performed by: EMERGENCY MEDICINE

## 2023-06-06 PROCEDURE — 36415 COLL VENOUS BLD VENIPUNCTURE: CPT

## 2023-06-06 PROCEDURE — 85027 COMPLETE CBC AUTOMATED: CPT

## 2023-06-06 RX ORDER — KETOROLAC TROMETHAMINE 30 MG/ML
30 INJECTION, SOLUTION INTRAMUSCULAR; INTRAVENOUS ONCE
Status: COMPLETED | OUTPATIENT
Start: 2023-06-06 | End: 2023-06-06

## 2023-06-06 RX ORDER — ONDANSETRON 2 MG/ML
4 INJECTION INTRAMUSCULAR; INTRAVENOUS ONCE
Status: COMPLETED | OUTPATIENT
Start: 2023-06-06 | End: 2023-06-06

## 2023-06-06 RX ADMIN — ONDANSETRON 4 MG: 2 INJECTION INTRAMUSCULAR; INTRAVENOUS at 18:22

## 2023-06-06 RX ADMIN — KETOROLAC TROMETHAMINE 30 MG: 30 INJECTION, SOLUTION INTRAMUSCULAR; INTRAVENOUS at 18:22

## 2023-06-06 ASSESSMENT — LIFESTYLE VARIABLES: HOW MANY STANDARD DRINKS CONTAINING ALCOHOL DO YOU HAVE ON A TYPICAL DAY: PATIENT DOES NOT DRINK

## 2023-06-06 ASSESSMENT — PAIN SCALES - GENERAL: PAINLEVEL_OUTOF10: 4

## 2023-06-06 NOTE — ED NOTES
Pt aware of need for urine sample, states she doesn't feel like she needs to right now.  Pt provided with ice water at this time     Megan Ordonez, RN  06/06/23 3217 [facility], RN  06/06/23 1003

## 2023-06-06 NOTE — ED PROVIDER NOTES
[facility] ED  Emergency Department Encounter  Emergency Medicine Resident     Pt Name:Gi Davison  MRN: [id number]  Birthdate 1980  Date of evaluation: 6/6/23  PCP:  TODD Narvaez CNP  6:12 PM EDT      CHIEF COMPLAINT       Chief Complaint   Patient presents with    Flank Pain     Left sided flank pain starting today around 1430. N/V.        HISTORY OF PRESENT ILLNESS  (Location/Symptom, Timing/Onset, Context/Setting, Quality, Duration, Modifying Factors, Severity.)      Jessica Tate is a 38 y.o. female who presents with left sided flank pain that started around 2:45 today. Patient reports feeling nauseated and vomited multiple times. No prior abdominal surgeries, no diarrhea, no fevers, or chills. No prior kidney stones, no dysuria, or hematuria. did not take anything for pain control. Otherwise healthy, does take lexapro. LMP 1 month ago    PAST MEDICAL / SURGICAL / SOCIAL / FAMILY HISTORY      has a past medical history of Exercise-induced asthma. has no past surgical history on file.       Social History     Socioeconomic History    Marital status:      Spouse name: Not on file    Number of children: Not on file    Years of education: Not on file    Highest education level: Not on file   Occupational History    Not on file   Tobacco Use    Smoking status: Never    Smokeless tobacco: Never   Substance and Sexual Activity    Alcohol use: Not on file    Drug use: Not on file    Sexual activity: Not on file   Other Topics Concern    Not on file   Social History Narrative    Not on file     Social Determinants of Health     Financial Resource Strain: Low Risk     Difficulty of Paying Living Expenses: Not hard at all   Food Insecurity: No Food Insecurity    Worried About Running Out of Food in the Last Year: Never true    Ran Out of Food in the Last Year: Never true   Transportation Needs: Not on file   Physical Activity: Not on file   Stress: Not on file

## 2023-06-07 ENCOUNTER — HOSPITAL ENCOUNTER (OUTPATIENT)
Dept: ULTRASOUND IMAGING | Age: 43
Discharge: HOME | End: 2023-06-09
Payer: COMMERCIAL

## 2023-06-07 ENCOUNTER — TELEPHONE (OUTPATIENT)
Dept: PRIMARY CARE CLINIC | Age: 43
End: 2023-06-07

## 2023-06-07 DIAGNOSIS — N83.202 LEFT OVARIAN CYST: ICD-10-CM

## 2023-06-07 PROCEDURE — 76856 US EXAM PELVIC COMPLETE: CPT

## 2023-06-07 PROCEDURE — 93976 VASCULAR STUDY: CPT

## 2023-06-07 NOTE — TELEPHONE ENCOUNTER
----- Message from TODD Cisneros CNP sent at 6/7/2023  8:06 AM EDT -----  Regarding: FW: Pelvic ultrasound  [facility] - please schedule ED f/u to discuss her pelvic pain and US results per recommendation of ED provider. She is planning to complete her US at 11am today so anytime after this. Maybe tomorrow or Friday?  ----- Message -----  From: Ree Whitmore MD  Sent: 6/6/2023   8:32 PM EDT  To: TODD Cisneros CNP  Subject: Pelvic ultrasound                                Jose Screen,    We are ordering an outpatient pelvic US on your patient. This will be done in AM on 6/7. Assuming there are no acute findings, are you able to follow-up on this study? Her OB/GYN is at [facility] so I'm unable to reach them. Thanks.     -- Ree Whitmore

## 2023-06-07 NOTE — ED PROVIDER NOTES
Care was assumed from the previous physician, Dr. Shamika Bhakta, at the conclusion of her clinical shift. At that time CT imaging was pending and there was a suspected diagnosis of left ureteral colic secondary to ureteral calculus. CT imaging does not demonstrate the evidence of any ureteral pathology. However, there are bilateral ovarian cysts noted. On reevaluation the patient reports that she feels considerably better. The pain is now 4/10 and improving; continues to be within the left flank. On my examination she has some mild discomfort with palpation of the left lower abdomen. I reviewed the CT imaging with her and discussed the potential for ovarian torsion recommendation for ultrasound. We discussed performing the study tonight versus tomorrow. She states that she feels better and would prefer to go home and go to bed and she will return if the pain worsens. I did advise her that we could not definitively rule out pathology such as ovarian torsion in the absence of ultrasound. As the patient prefers to go home, plan at this time is for ultrasound tomorrow. This has been scheduled for 11 AM and I have requested that her PCP review the results with her. Clinically, given the sudden onset of symptoms, exam and lab profile I do not suspect STI.      Tretha Schwab, MD  06/06/23 2035

## 2023-06-07 NOTE — DISCHARGE INSTRUCTIONS
The cause of your pain was not identified during this visit. CT imaging did incidentally note the presence of cyst on each ovary. The radiologist has recommended that you have an ultrasound to further evaluate these. This has been scheduled for 11 AM tomorrow (6/7). Please arrive to the outpatient radiology department 15 minutes prior to the study. Additionally, please ensure that you have a full bladder and do not urinate prior to the test.    In the interim, return to the ED if you develop vomiting, fever, recurrent or worsening pain or any other concerns.

## 2023-06-08 ENCOUNTER — OFFICE VISIT (OUTPATIENT)
Dept: PRIMARY CARE CLINIC | Age: 43
End: 2023-06-08

## 2023-06-08 VITALS
BODY MASS INDEX: 24.25 KG/M2 | WEIGHT: 154.5 LBS | DIASTOLIC BLOOD PRESSURE: 64 MMHG | HEART RATE: 76 BPM | SYSTOLIC BLOOD PRESSURE: 98 MMHG | HEIGHT: 67 IN | OXYGEN SATURATION: 98 % | TEMPERATURE: 98.6 F

## 2023-06-08 DIAGNOSIS — N83.201 BILATERAL OVARIAN CYSTS: Primary | ICD-10-CM

## 2023-06-08 DIAGNOSIS — N83.202 BILATERAL OVARIAN CYSTS: Primary | ICD-10-CM

## 2023-06-08 DIAGNOSIS — R10.2 PELVIC PAIN: ICD-10-CM

## 2023-06-08 PROBLEM — F33.1 MAJOR DEPRESSIVE DISORDER, RECURRENT, MODERATE (HCC): Status: ACTIVE | Noted: 2023-06-08

## 2023-06-08 PROBLEM — F33.9 MAJOR DEPRESSIVE DISORDER, RECURRENT, UNSPECIFIED (HCC): Status: ACTIVE | Noted: 2023-06-08

## 2023-06-08 PROBLEM — F33.0 MAJOR DEPRESSIVE DISORDER, RECURRENT, MILD (HCC): Status: ACTIVE | Noted: 2023-06-08

## 2023-06-08 SDOH — ECONOMIC STABILITY: FOOD INSECURITY: WITHIN THE PAST 12 MONTHS, THE FOOD YOU BOUGHT JUST DIDN'T LAST AND YOU DIDN'T HAVE MONEY TO GET MORE.: NEVER TRUE

## 2023-06-08 SDOH — ECONOMIC STABILITY: HOUSING INSECURITY
IN THE LAST 12 MONTHS, WAS THERE A TIME WHEN YOU DID NOT HAVE A STEADY PLACE TO SLEEP OR SLEPT IN A SHELTER (INCLUDING NOW)?: NO

## 2023-06-08 SDOH — ECONOMIC STABILITY: INCOME INSECURITY: HOW HARD IS IT FOR YOU TO PAY FOR THE VERY BASICS LIKE FOOD, HOUSING, MEDICAL CARE, AND HEATING?: NOT HARD AT ALL

## 2023-06-08 SDOH — ECONOMIC STABILITY: FOOD INSECURITY: WITHIN THE PAST 12 MONTHS, YOU WORRIED THAT YOUR FOOD WOULD RUN OUT BEFORE YOU GOT MONEY TO BUY MORE.: NEVER TRUE

## 2023-06-08 ASSESSMENT — PATIENT HEALTH QUESTIONNAIRE - PHQ9
SUM OF ALL RESPONSES TO PHQ QUESTIONS 1-9: 0
2. FEELING DOWN, DEPRESSED OR HOPELESS: 0
1. LITTLE INTEREST OR PLEASURE IN DOING THINGS: 0
SUM OF ALL RESPONSES TO PHQ9 QUESTIONS 1 & 2: 0
SUM OF ALL RESPONSES TO PHQ QUESTIONS 1-9: 0

## 2023-06-08 ASSESSMENT — ENCOUNTER SYMPTOMS: CONSTIPATION: 0

## 2023-06-08 NOTE — PROGRESS NOTES
Name: Ar Trivedi  : 1980         Chief Complaint:     Chief Complaint   Patient presents with    Pelvic Pain     -pelvic pain is much better  -in the ED on 23  -US done 23 that noted cysts on her ovaries       History of Present Illness:      Ar Trivedi is a 38 y.o.  female who presents with Pelvic Pain (-pelvic pain is much better/-in the ED on 23/-US done 23 that noted cysts on her ovaries)      HPI    The patient presents for ED follow-up. She presented to [facility], ED 2023 with concerns of left-sided flank pain. CT abdomen pelvis without contrast completed in the ED on 2023 showed prominent right ovary versus mildly complex cyst measuring up to 4 cm and a simple left ovarian cyst measuring up to 6 cm. Follow-up pelvic ultrasound recommended. Ultrasound pelvis completed 2023 showed right ovarian hemorrhagic cyst measuring up to 4.7 cm, normal arterial and venous Doppler flow and associated with the ovaries, right ovarian follicles, anteverted uterus, endometrial stripe thickness within the normal limits 1cm. Radiology recommends an ultrasound follow-up in 6-12 weeks, if unchanged at this time continue follow-up with ultrasound or MRI with IV contrast.  2023 without UTI, pregnancy negative. Today, she states she is doing \"much, much better\". Since being home from the ED, she has been taking ibuprofen with benefit. Yesterday, she stayed home from work. Yesterday, she continued to have discomfort in the left flank/oblique area that she describes as \"being on her period\". Yesterday, she had some nausea in the morning. She has been resting. She is due to start her period [place] day\". She has never had any ovarian cysts before. She has never had any gynecology surgery. She is currently not on any form of birth control.      Past Medical History:     Past Medical History:   Diagnosis Date    Exercise-induced asthma       Reviewed all health maintenance

## 2023-06-08 NOTE — PATIENT INSTRUCTIONS
SURVEY:    You may be receiving a survey from [employer] regarding your visit today. Please complete the survey to enable us to provide the highest quality of care to you and your family. If you cannot score us a very good on any question, please call the office to discuss how we could of made your experience a very good one. Thank you.

## 2023-09-20 ENCOUNTER — OFFICE VISIT (OUTPATIENT)
Dept: PRIMARY CARE CLINIC | Age: 43
End: 2023-09-20
Payer: COMMERCIAL

## 2023-09-20 VITALS
OXYGEN SATURATION: 98 % | BODY MASS INDEX: 23.7 KG/M2 | SYSTOLIC BLOOD PRESSURE: 100 MMHG | WEIGHT: 151 LBS | TEMPERATURE: 98.3 F | HEART RATE: 89 BPM | RESPIRATION RATE: 18 BRPM | DIASTOLIC BLOOD PRESSURE: 60 MMHG | HEIGHT: 67 IN

## 2023-09-20 DIAGNOSIS — Z00.00 WELLNESS EXAMINATION: Primary | ICD-10-CM

## 2023-09-20 DIAGNOSIS — Z11.59 NEED FOR HEPATITIS C SCREENING TEST: ICD-10-CM

## 2023-09-20 DIAGNOSIS — F33.42 RECURRENT MAJOR DEPRESSIVE DISORDER, IN FULL REMISSION (HCC): ICD-10-CM

## 2023-09-20 DIAGNOSIS — Z11.4 ENCOUNTER FOR SCREENING FOR HIV: ICD-10-CM

## 2023-09-20 DIAGNOSIS — R53.83 OTHER FATIGUE: ICD-10-CM

## 2023-09-20 PROCEDURE — 99396 PREV VISIT EST AGE 40-64: CPT | Performed by: NURSE PRACTITIONER

## 2023-09-20 ASSESSMENT — ENCOUNTER SYMPTOMS
DIARRHEA: 0
WHEEZING: 0
SINUS PAIN: 0
SINUS PRESSURE: 0
ABDOMINAL PAIN: 0
RHINORRHEA: 1
SHORTNESS OF BREATH: 0
COUGH: 0
CONSTIPATION: 0
SORE THROAT: 0

## 2023-09-20 NOTE — PATIENT INSTRUCTIONS
SURVEY:    You may be receiving a survey from [employer] regarding your visit today. Please complete the survey to enable us to provide the highest quality of care to you and your family. If you cannot score us a very good on any question, please call the office to discuss how we could have made your experience a very good one. Thank you.
No

## 2023-09-20 NOTE — PROGRESS NOTES
Expiration Date:   9/19/2024    Comprehensive Metabolic Panel     Standing Status:   Future     Standing Expiration Date:   9/19/2024    CBC     Standing Status:   Future     Standing Expiration Date:   9/19/2024    HIV Screen     Standing Status:   Future     Standing Expiration Date:   9/20/2024    Hepatitis C Antibody     Standing Status:   Future     Standing Expiration Date:   9/20/2024    TSH With Reflex Ft4     Standing Status:   Future     Standing Expiration Date:   9/20/2024    Vitamin B12     Standing Status:   Future     Standing Expiration Date:   9/19/2024        No results found for this visit on 09/20/23. Return in about 1 year (around 9/20/2024), or if symptoms worsen or fail to improve, for wellness .     Electronically signed by TODD Johns CNP on 09/20/23 at 12:18 PM.

## 2023-09-27 ENCOUNTER — HOSPITAL ENCOUNTER (OUTPATIENT)
Age: 43
Discharge: HOME | End: 2023-09-27
Payer: COMMERCIAL

## 2023-09-27 DIAGNOSIS — Z00.00 WELLNESS EXAMINATION: ICD-10-CM

## 2023-09-27 DIAGNOSIS — Z11.4 ENCOUNTER FOR SCREENING FOR HIV: ICD-10-CM

## 2023-09-27 DIAGNOSIS — Z11.59 NEED FOR HEPATITIS C SCREENING TEST: ICD-10-CM

## 2023-09-27 DIAGNOSIS — R53.83 OTHER FATIGUE: ICD-10-CM

## 2023-09-27 LAB
ALBUMIN SERPL-MCNC: 4.6 G/DL (ref 3.5–5.2)
ALBUMIN/GLOB SERPL: 1.6 {RATIO} (ref 1–2.5)
ALP SERPL-CCNC: 43 U/L (ref 35–104)
ALT SERPL-CCNC: 15 U/L (ref 5–33)
ANION GAP SERPL CALCULATED.3IONS-SCNC: 10 MMOL/L (ref 9–17)
AST SERPL-CCNC: 19 U/L
BILIRUB SERPL-MCNC: 0.4 MG/DL (ref 0.3–1.2)
BUN SERPL-MCNC: 10 MG/DL (ref 6–20)
BUN/CREAT SERPL: 13 (ref 9–20)
CALCIUM SERPL-MCNC: 9.5 MG/DL (ref 8.6–10.4)
CHLORIDE SERPL-SCNC: 105 MMOL/L (ref 98–107)
CHOLEST SERPL-MCNC: 228 MG/DL (ref 0–199)
CHOLESTEROL/HDL RATIO: 3
CO2 SERPL-SCNC: 26 MMOL/L (ref 20–31)
CREAT SERPL-MCNC: 0.8 MG/DL (ref 0.5–0.9)
ERYTHROCYTE [DISTWIDTH] IN BLOOD BY AUTOMATED COUNT: 13.6 % (ref 11.8–14.4)
GFR SERPL CREATININE-BSD FRML MDRD: >60 ML/MIN/1.73M2
GLUCOSE SERPL-MCNC: 90 MG/DL (ref 70–99)
HCT VFR BLD AUTO: 40.6 % (ref 36.3–47.1)
HCV AB SERPL QL IA: NONREACTIVE
HDLC SERPL-MCNC: 81 MG/DL (ref 0–40)
HGB BLD-MCNC: 13.5 G/DL (ref 11.9–15.1)
HIV 1+2 AB+HIV1 P24 AG SERPL QL IA: NONREACTIVE
LDLC SERPL CALC-MCNC: 132 MG/DL (ref 0–100)
MCH RBC QN AUTO: 31.2 PG (ref 25.2–33.5)
MCHC RBC AUTO-ENTMCNC: 33.3 G/DL (ref 28.4–34.8)
MCV RBC AUTO: 93.8 FL (ref 82.6–102.9)
NRBC BLD-RTO: 0 PER 100 WBC
PLATELET # BLD AUTO: 237 K/UL (ref 138–453)
PMV BLD AUTO: 10.9 FL (ref 8.1–13.5)
POTASSIUM SERPL-SCNC: 4.2 MMOL/L (ref 3.7–5.3)
PROT SERPL-MCNC: 7.5 G/DL (ref 6.4–8.3)
RBC # BLD AUTO: 4.33 M/UL (ref 3.95–5.11)
SODIUM SERPL-SCNC: 141 MMOL/L (ref 135–144)
TRIGL SERPL-MCNC: 75 MG/DL (ref 0–149)
TSH SERPL DL<=0.05 MIU/L-ACNC: 2.1 UIU/ML (ref 0.3–5)
VIT B12 SERPL-MCNC: 649 PG/ML (ref 232–1245)
VLDLC SERPL CALC-MCNC: 15 MG/DL
WBC OTHER # BLD: 5.3 K/UL (ref 3.5–11.3)

## 2023-09-27 PROCEDURE — 87389 HIV-1 AG W/HIV-1&-2 AB AG IA: CPT

## 2023-09-27 PROCEDURE — 82607 VITAMIN B-12: CPT

## 2023-09-27 PROCEDURE — 85027 COMPLETE CBC AUTOMATED: CPT

## 2023-09-27 PROCEDURE — 80053 COMPREHEN METABOLIC PANEL: CPT

## 2023-09-27 PROCEDURE — 36415 COLL VENOUS BLD VENIPUNCTURE: CPT

## 2023-09-27 PROCEDURE — 80061 LIPID PANEL: CPT

## 2023-09-27 PROCEDURE — 84443 ASSAY THYROID STIM HORMONE: CPT

## 2023-09-27 PROCEDURE — 86803 HEPATITIS C AB TEST: CPT

## 2023-10-12 ENCOUNTER — TELEPHONE (OUTPATIENT)
Dept: PRIMARY CARE CLINIC | Age: 43
End: 2023-10-12

## 2023-10-12 NOTE — TELEPHONE ENCOUNTER
Regarding: Test Results & Form Faxed  Contact: [phone]  Hello again! I just received a message from my employer that the form submitted is incomplete as it's missing the health provider's signature. Could this please be fixed and the form resubmitted?     ThanksCharlie

## 2023-12-06 ENCOUNTER — OFFICE VISIT (OUTPATIENT)
Dept: PRIMARY CARE CLINIC | Age: 43
End: 2023-12-06
Payer: COMMERCIAL

## 2023-12-06 VITALS
OXYGEN SATURATION: 98 % | TEMPERATURE: 97.5 F | DIASTOLIC BLOOD PRESSURE: 70 MMHG | SYSTOLIC BLOOD PRESSURE: 110 MMHG | BODY MASS INDEX: 24.33 KG/M2 | HEIGHT: 67 IN | HEART RATE: 88 BPM | WEIGHT: 155 LBS

## 2023-12-06 DIAGNOSIS — R09.81 SINUS CONGESTION: ICD-10-CM

## 2023-12-06 DIAGNOSIS — R05.9 COUGH, UNSPECIFIED TYPE: ICD-10-CM

## 2023-12-06 DIAGNOSIS — J10.1 INFLUENZA A: Primary | ICD-10-CM

## 2023-12-06 DIAGNOSIS — J02.9 SORE THROAT: ICD-10-CM

## 2023-12-06 LAB
INFLUENZA A ANTIBODY: NORMAL
INFLUENZA B ANTIBODY: NORMAL
Lab: NORMAL
PERFORMING INSTRUMENT: NORMAL
QC PASS/FAIL: NORMAL
SARS-COV-2, POC: NORMAL

## 2023-12-06 PROCEDURE — 87804 INFLUENZA ASSAY W/OPTIC: CPT | Performed by: NURSE PRACTITIONER

## 2023-12-06 PROCEDURE — 99214 OFFICE O/P EST MOD 30 MIN: CPT | Performed by: NURSE PRACTITIONER

## 2023-12-06 PROCEDURE — 87426 SARSCOV CORONAVIRUS AG IA: CPT | Performed by: NURSE PRACTITIONER

## 2023-12-06 RX ORDER — BENZONATATE 100 MG/1
100 CAPSULE ORAL 3 TIMES DAILY PRN
Qty: 30 CAPSULE | Refills: 0 | Status: SHIPPED | OUTPATIENT
Start: 2023-12-06 | End: 2023-12-16

## 2023-12-06 ASSESSMENT — ENCOUNTER SYMPTOMS
RHINORRHEA: 1
COUGH: 1

## 2023-12-06 NOTE — PROGRESS NOTES
Name: Masha Alcantara  : 1980         Chief Complaint:     Chief Complaint   Patient presents with    Sinus Problem     -sinus congestion  -nasal drainage  -cough  -sore throat  -started last week  -treatment OTC sinus meds       History of Present Illness:      Masha Alcantara is a 41 y.o.  female who presents with Sinus Problem (-sinus congestion/-nasal drainage/-cough/-sore throat/-started last week/-treatment OTC sinus meds)      HPI    The patient presents with sinus congestion, nasal drainage, cough, sore throat, and mild epistaxis that began 6 days ago. She has tried OTC alkaseltzer cough/mucus relief with mild relief. Nasal drainage is green. Denies fever or chills. Denies SOB or wheezing. Past Medical History:     Past Medical History:   Diagnosis Date    Exercise-induced asthma       Reviewed all health maintenance requirements and ordered appropriate tests  Health Maintenance Due   Topic Date Due    Hepatitis B vaccine (1 of 3 - 3-dose series) Never done    Varicella vaccine (1 of 2 - 2-dose childhood series) Never done    Pneumococcal 0-64 years Vaccine (2 - PCV) 2021    Flu vaccine (1) 2023    COVID-19 Vaccine (3 - -24 season) 2023       Past Surgical History:     No past surgical history on file. Medications:       Prior to Admission medications    Medication Sig Start Date End Date Taking?  Authorizing Provider   benzonatate (TESSALON) 100 MG capsule Take 1 capsule by mouth 3 times daily as needed for Cough 23 Yes TODD Gallo CNP   Multiple Vitamins-Minerals (MULTIVITAMIN ADULTS PO) Take by mouth   Yes Provider, MD Roland   escitalopram (LEXAPRO) 20 MG tablet TAKE ONE TABLET BY MOUTH DAILY 23  Yes TODD Gallo CNP   fluticasone (FLONASE) 50 MCG/ACT nasal spray SPRAY ONE SPRAY IN EACH NOSTRIL ONCE DAILY 23  Yes TODD Gallo CNP   FLOVENT HFA 44 MCG/ACT inhaler INHALE TWO PUFFS BY MOUTH TWICE A

## 2023-12-06 NOTE — PATIENT INSTRUCTIONS
SURVEY:    You may be receiving a survey from [employer] regarding your visit today. Please complete the survey to enable us to provide the highest quality of care to you and your family. If you cannot score us a very good on any question, please call the office to discuss how we could have made your experience a very good one. Thank you.

## 2024-01-12 ENCOUNTER — OFFICE VISIT (OUTPATIENT)
Dept: PRIMARY CARE CLINIC | Age: 44
End: 2024-01-12
Payer: COMMERCIAL

## 2024-01-12 VITALS
OXYGEN SATURATION: 99 % | WEIGHT: 156 LBS | DIASTOLIC BLOOD PRESSURE: 62 MMHG | TEMPERATURE: 97.3 F | BODY MASS INDEX: 24.48 KG/M2 | HEART RATE: 100 BPM | SYSTOLIC BLOOD PRESSURE: 102 MMHG | HEIGHT: 67 IN

## 2024-01-12 DIAGNOSIS — J35.1 TONSILLAR HYPERTROPHY: ICD-10-CM

## 2024-01-12 DIAGNOSIS — J35.8 TONSIL STONE: ICD-10-CM

## 2024-01-12 DIAGNOSIS — J02.0 STREP PHARYNGITIS: Primary | ICD-10-CM

## 2024-01-12 LAB — S PYO AG THROAT QL: POSITIVE

## 2024-01-12 PROCEDURE — 87880 STREP A ASSAY W/OPTIC: CPT | Performed by: NURSE PRACTITIONER

## 2024-01-12 PROCEDURE — 99214 OFFICE O/P EST MOD 30 MIN: CPT | Performed by: NURSE PRACTITIONER

## 2024-01-12 RX ORDER — IBUPROFEN 800 MG/1
800 TABLET ORAL EVERY 8 HOURS PRN
Qty: 90 TABLET | Refills: 1 | Status: SHIPPED | OUTPATIENT
Start: 2024-01-12

## 2024-01-12 RX ORDER — AMOXICILLIN 500 MG/1
500 CAPSULE ORAL 2 TIMES DAILY
Qty: 20 CAPSULE | Refills: 0 | Status: SHIPPED | OUTPATIENT
Start: 2024-01-12 | End: 2024-01-22

## 2024-01-12 ASSESSMENT — PATIENT HEALTH QUESTIONNAIRE - PHQ9
SUM OF ALL RESPONSES TO PHQ QUESTIONS 1-9: 0
5. POOR APPETITE OR OVEREATING: 0
7. TROUBLE CONCENTRATING ON THINGS, SUCH AS READING THE NEWSPAPER OR WATCHING TELEVISION: 0
10. IF YOU CHECKED OFF ANY PROBLEMS, HOW DIFFICULT HAVE THESE PROBLEMS MADE IT FOR YOU TO DO YOUR WORK, TAKE CARE OF THINGS AT HOME, OR GET ALONG WITH OTHER PEOPLE: 0
1. LITTLE INTEREST OR PLEASURE IN DOING THINGS: 0
3. TROUBLE FALLING OR STAYING ASLEEP: 0
SUM OF ALL RESPONSES TO PHQ QUESTIONS 1-9: 0
9. THOUGHTS THAT YOU WOULD BE BETTER OFF DEAD, OR OF HURTING YOURSELF: 0
8. MOVING OR SPEAKING SO SLOWLY THAT OTHER PEOPLE COULD HAVE NOTICED. OR THE OPPOSITE, BEING SO FIGETY OR RESTLESS THAT YOU HAVE BEEN MOVING AROUND A LOT MORE THAN USUAL: 0
4. FEELING TIRED OR HAVING LITTLE ENERGY: 0
SUM OF ALL RESPONSES TO PHQ9 QUESTIONS 1 & 2: 0
6. FEELING BAD ABOUT YOURSELF - OR THAT YOU ARE A FAILURE OR HAVE LET YOURSELF OR YOUR FAMILY DOWN: 0
2. FEELING DOWN, DEPRESSED OR HOPELESS: 0
SUM OF ALL RESPONSES TO PHQ QUESTIONS 1-9: 0
SUM OF ALL RESPONSES TO PHQ QUESTIONS 1-9: 0

## 2024-01-12 ASSESSMENT — ENCOUNTER SYMPTOMS: SORE THROAT: 1

## 2024-01-12 NOTE — PROGRESS NOTES
Name: Gi Adams  : 1980         Chief Complaint:     Chief Complaint   Patient presents with    Tonsil stones     -started 4 days ago  -sore tonsil let side       History of Present Illness:      Gi Adams is a 43 y.o.  female who presents with Tonsil stones (-started 4 days ago/-sore tonsil let side)      HPI    The patient presents with concerns of tonsil stones. She states she has had these for 10 years but they have only been 1-2 at a time. However, this week she started with significant amount of tonsil stones on her left side. She states that her tonsil is becoming more painful. She can expel the stones with her tongue. She has been taking ibuprofen, using salt water gargles, gently brushing the tonsil with toothbrush, and trying to remove with basic dental tools gently at home. Denies sore throat, but admits pain with swallowing and eating to the left tonsil. Denies fever or chills.     Past Medical History:     Past Medical History:   Diagnosis Date    Exercise-induced asthma       Reviewed all health maintenance requirements and ordered appropriate tests  Health Maintenance Due   Topic Date Due    Hepatitis B vaccine (1 of 3 - 3-dose series) Never done    Varicella vaccine (1 of 2 - 2-dose childhood series) Never done    Pneumococcal 0-64 years Vaccine (2 - PCV) 2021    Flu vaccine (1) 2023    COVID-19 Vaccine (3 - - season) 2023       Past Surgical History:     No past surgical history on file.     Medications:       Prior to Admission medications    Medication Sig Start Date End Date Taking? Authorizing Provider   ibuprofen (ADVIL;MOTRIN) 800 MG tablet Take 1 tablet by mouth every 8 hours as needed for Pain . Take with food. 24  Yes Ashlyn Sr APRN - CNP   amoxicillin (AMOXIL) 500 MG capsule Take 1 capsule by mouth 2 times daily for 10 days 24 Yes Ashlyn Sr APRN - CNP   Multiple Vitamins-Minerals (MULTIVITAMIN ADULTS PO)

## 2024-07-22 RX ORDER — IBUPROFEN 800 MG/1
800 TABLET ORAL EVERY 8 HOURS PRN
Qty: 90 TABLET | Refills: 1 | Status: SHIPPED | OUTPATIENT
Start: 2024-07-22

## 2024-09-30 ENCOUNTER — OFFICE VISIT (OUTPATIENT)
Dept: PRIMARY CARE CLINIC | Age: 44
End: 2024-09-30
Payer: COMMERCIAL

## 2024-09-30 VITALS
OXYGEN SATURATION: 97 % | TEMPERATURE: 97.3 F | DIASTOLIC BLOOD PRESSURE: 72 MMHG | HEART RATE: 72 BPM | RESPIRATION RATE: 16 BRPM | SYSTOLIC BLOOD PRESSURE: 118 MMHG | BODY MASS INDEX: 25.3 KG/M2 | WEIGHT: 161.6 LBS

## 2024-09-30 DIAGNOSIS — F33.1 MAJOR DEPRESSIVE DISORDER, RECURRENT, MODERATE (HCC): ICD-10-CM

## 2024-09-30 DIAGNOSIS — F33.42 RECURRENT MAJOR DEPRESSIVE DISORDER, IN FULL REMISSION (HCC): ICD-10-CM

## 2024-09-30 DIAGNOSIS — Z00.00 WELLNESS EXAMINATION: Primary | ICD-10-CM

## 2024-09-30 DIAGNOSIS — R22.41 LEG MASS, RIGHT: ICD-10-CM

## 2024-09-30 PROBLEM — R68.89 FLU-LIKE SYMPTOMS: Status: RESOLVED | Noted: 2018-02-19 | Resolved: 2024-09-30

## 2024-09-30 PROBLEM — J45.909 ACUTE ASTHMA: Status: RESOLVED | Noted: 2018-02-19 | Resolved: 2024-09-30

## 2024-09-30 PROBLEM — L25.9 CONTACT DERMATITIS: Status: RESOLVED | Noted: 2018-07-13 | Resolved: 2024-09-30

## 2024-09-30 PROCEDURE — 99396 PREV VISIT EST AGE 40-64: CPT | Performed by: NURSE PRACTITIONER

## 2024-09-30 PROCEDURE — 99213 OFFICE O/P EST LOW 20 MIN: CPT | Performed by: NURSE PRACTITIONER

## 2024-09-30 RX ORDER — IBUPROFEN 800 MG/1
800 TABLET, FILM COATED ORAL 2 TIMES DAILY PRN
Qty: 180 TABLET | Refills: 1 | Status: SHIPPED | OUTPATIENT
Start: 2024-09-30 | End: 2025-03-29

## 2024-09-30 RX ORDER — ALBUTEROL SULFATE 90 UG/1
2 INHALANT RESPIRATORY (INHALATION) EVERY 4 HOURS PRN
Qty: 3 EACH | Refills: 3 | Status: SHIPPED | OUTPATIENT
Start: 2024-09-30

## 2024-09-30 SDOH — ECONOMIC STABILITY: INCOME INSECURITY: HOW HARD IS IT FOR YOU TO PAY FOR THE VERY BASICS LIKE FOOD, HOUSING, MEDICAL CARE, AND HEATING?: NOT HARD AT ALL

## 2024-09-30 SDOH — ECONOMIC STABILITY: FOOD INSECURITY: WITHIN THE PAST 12 MONTHS, YOU WORRIED THAT YOUR FOOD WOULD RUN OUT BEFORE YOU GOT MONEY TO BUY MORE.: NEVER TRUE

## 2024-09-30 SDOH — ECONOMIC STABILITY: FOOD INSECURITY: WITHIN THE PAST 12 MONTHS, THE FOOD YOU BOUGHT JUST DIDN'T LAST AND YOU DIDN'T HAVE MONEY TO GET MORE.: NEVER TRUE

## 2024-09-30 ASSESSMENT — PATIENT HEALTH QUESTIONNAIRE - PHQ9
SUM OF ALL RESPONSES TO PHQ QUESTIONS 1-9: 4
6. FEELING BAD ABOUT YOURSELF - OR THAT YOU ARE A FAILURE OR HAVE LET YOURSELF OR YOUR FAMILY DOWN: NOT AT ALL
SUM OF ALL RESPONSES TO PHQ QUESTIONS 1-9: 4
8. MOVING OR SPEAKING SO SLOWLY THAT OTHER PEOPLE COULD HAVE NOTICED. OR THE OPPOSITE, BEING SO FIGETY OR RESTLESS THAT YOU HAVE BEEN MOVING AROUND A LOT MORE THAN USUAL: SEVERAL DAYS
1. LITTLE INTEREST OR PLEASURE IN DOING THINGS: NOT AT ALL
7. TROUBLE CONCENTRATING ON THINGS, SUCH AS READING THE NEWSPAPER OR WATCHING TELEVISION: SEVERAL DAYS
5. POOR APPETITE OR OVEREATING: NOT AT ALL
10. IF YOU CHECKED OFF ANY PROBLEMS, HOW DIFFICULT HAVE THESE PROBLEMS MADE IT FOR YOU TO DO YOUR WORK, TAKE CARE OF THINGS AT HOME, OR GET ALONG WITH OTHER PEOPLE: SOMEWHAT DIFFICULT
9. THOUGHTS THAT YOU WOULD BE BETTER OFF DEAD, OR OF HURTING YOURSELF: NOT AT ALL
SUM OF ALL RESPONSES TO PHQ9 QUESTIONS 1 & 2: 0
2. FEELING DOWN, DEPRESSED OR HOPELESS: NOT AT ALL
3. TROUBLE FALLING OR STAYING ASLEEP: SEVERAL DAYS
4. FEELING TIRED OR HAVING LITTLE ENERGY: SEVERAL DAYS
SUM OF ALL RESPONSES TO PHQ QUESTIONS 1-9: 4
SUM OF ALL RESPONSES TO PHQ QUESTIONS 1-9: 4

## 2024-09-30 ASSESSMENT — ENCOUNTER SYMPTOMS
DIARRHEA: 0
SHORTNESS OF BREATH: 0
CONSTIPATION: 0
WHEEZING: 0
COUGH: 0

## 2024-09-30 NOTE — PROGRESS NOTES
no guarding.   Musculoskeletal:      Comments: Right lower extremity: Anterior/lateral aspect of lower leg with small palpable nodule, estimated 2 cm in diameter with no overlying skin changes   Lymphadenopathy:      Cervical: No cervical adenopathy.   Neurological:      Mental Status: She is alert.   Psychiatric:         Mood and Affect: Mood normal.         Behavior: Behavior normal.         Thought Content: Thought content normal.         Judgment: Judgment normal.         Data:     Lab Results   Component Value Date/Time     09/27/2023 07:14 AM    K 4.2 09/27/2023 07:14 AM     09/27/2023 07:14 AM    CO2 26 09/27/2023 07:14 AM    BUN 10 09/27/2023 07:14 AM    CREATININE 0.8 09/27/2023 07:14 AM    GLUCOSE 90 09/27/2023 07:14 AM    GLUCOSE 89 01/16/2012 11:40 AM    BILITOT 0.4 09/27/2023 07:14 AM    ALKPHOS 43 09/27/2023 07:14 AM    AST 19 09/27/2023 07:14 AM    ALT 15 09/27/2023 07:14 AM     Lab Results   Component Value Date/Time    WBC 5.3 09/27/2023 07:14 AM    RBC 4.33 09/27/2023 07:14 AM    RBC 4.20 01/16/2012 11:40 AM    HGB 13.5 09/27/2023 07:14 AM    HCT 40.6 09/27/2023 07:14 AM    MCV 93.8 09/27/2023 07:14 AM    MCH 31.2 09/27/2023 07:14 AM    MCHC 33.3 09/27/2023 07:14 AM    RDW 13.6 09/27/2023 07:14 AM     09/27/2023 07:14 AM     01/16/2012 11:40 AM    MPV 10.9 09/27/2023 07:14 AM     Lab Results   Component Value Date/Time    TSH 2.10 09/27/2023 07:14 AM     Lab Results   Component Value Date/Time    CHOL 228 09/27/2023 07:14 AM     09/27/2023 07:14 AM    HDL 81 09/27/2023 07:14 AM    LABA1C 5.3 09/23/2021 07:13 AM       Assessment/Plan:      Diagnosis Orders   1. Wellness examination  Lipid Panel    Hemoglobin A1C    CBC    Basic Metabolic Panel    AST    ALT    Vitamin D 25 Hydroxy    TSH With Reflex Ft4      2. Recurrent major depressive disorder, in full remission (HCC)        3. Major depressive disorder, recurrent, moderate (HCC)        4. Leg mass, right  US

## 2024-10-08 ENCOUNTER — HOSPITAL ENCOUNTER (OUTPATIENT)
Age: 44
Discharge: HOME | End: 2024-10-08
Payer: COMMERCIAL

## 2024-10-08 DIAGNOSIS — Z00.00 WELLNESS EXAMINATION: ICD-10-CM

## 2024-10-08 LAB
25(OH)D3 SERPL-MCNC: 33.6 NG/ML (ref 30–100)
ALT SERPL-CCNC: 11 U/L (ref 10–35)
ANION GAP SERPL CALCULATED.3IONS-SCNC: 10 MMOL/L (ref 9–16)
AST SERPL-CCNC: 16 U/L (ref 10–35)
BUN SERPL-MCNC: 20 MG/DL (ref 6–20)
BUN/CREAT SERPL: 25 (ref 9–20)
CALCIUM SERPL-MCNC: 9.3 MG/DL (ref 8.6–10.4)
CHLORIDE SERPL-SCNC: 103 MMOL/L (ref 98–107)
CHOLEST SERPL-MCNC: 248 MG/DL (ref 0–199)
CHOLESTEROL/HDL RATIO: 3
CO2 SERPL-SCNC: 25 MMOL/L (ref 20–31)
CREAT SERPL-MCNC: 0.8 MG/DL (ref 0.5–0.9)
ERYTHROCYTE [DISTWIDTH] IN BLOOD BY AUTOMATED COUNT: 13.6 % (ref 11.8–14.4)
EST. AVERAGE GLUCOSE BLD GHB EST-MCNC: 105 MG/DL
GFR, ESTIMATED: >90 ML/MIN/1.73M2
GLUCOSE SERPL-MCNC: 85 MG/DL (ref 74–99)
HBA1C MFR BLD: 5.3 % (ref 4–6)
HCT VFR BLD AUTO: 41.7 % (ref 36.3–47.1)
HDLC SERPL-MCNC: 74 MG/DL
HGB BLD-MCNC: 13.6 G/DL (ref 11.9–15.1)
LDLC SERPL CALC-MCNC: 153 MG/DL (ref 0–100)
MCH RBC QN AUTO: 30.4 PG (ref 25.2–33.5)
MCHC RBC AUTO-ENTMCNC: 32.6 G/DL (ref 28.4–34.8)
MCV RBC AUTO: 93.1 FL (ref 82.6–102.9)
NRBC BLD-RTO: 0 PER 100 WBC
PLATELET # BLD AUTO: 253 K/UL (ref 138–453)
PMV BLD AUTO: 11 FL (ref 8.1–13.5)
POTASSIUM SERPL-SCNC: 4.2 MMOL/L (ref 3.7–5.3)
RBC # BLD AUTO: 4.48 M/UL (ref 3.95–5.11)
SODIUM SERPL-SCNC: 138 MMOL/L (ref 136–145)
TRIGL SERPL-MCNC: 104 MG/DL
TSH SERPL DL<=0.05 MIU/L-ACNC: 3.42 UIU/ML (ref 0.27–4.2)
VLDLC SERPL CALC-MCNC: 21 MG/DL
WBC OTHER # BLD: 6.5 K/UL (ref 3.5–11.3)

## 2024-10-08 PROCEDURE — 82306 VITAMIN D 25 HYDROXY: CPT

## 2024-10-08 PROCEDURE — 83036 HEMOGLOBIN GLYCOSYLATED A1C: CPT

## 2024-10-08 PROCEDURE — 80061 LIPID PANEL: CPT

## 2024-10-08 PROCEDURE — 84450 TRANSFERASE (AST) (SGOT): CPT

## 2024-10-08 PROCEDURE — 80048 BASIC METABOLIC PNL TOTAL CA: CPT

## 2024-10-08 PROCEDURE — 84460 ALANINE AMINO (ALT) (SGPT): CPT

## 2024-10-08 PROCEDURE — 85027 COMPLETE CBC AUTOMATED: CPT

## 2024-10-08 PROCEDURE — 84443 ASSAY THYROID STIM HORMONE: CPT

## 2024-10-08 PROCEDURE — 36415 COLL VENOUS BLD VENIPUNCTURE: CPT

## 2024-10-09 PROBLEM — E78.2 MODERATE MIXED HYPERLIPIDEMIA NOT REQUIRING STATIN THERAPY: Status: ACTIVE | Noted: 2024-10-09

## 2024-10-18 ENCOUNTER — HOSPITAL ENCOUNTER (OUTPATIENT)
Dept: ULTRASOUND IMAGING | Age: 44
Discharge: HOME | End: 2024-10-20
Payer: COMMERCIAL

## 2024-10-18 DIAGNOSIS — R22.41 LEG MASS, RIGHT: ICD-10-CM

## 2024-10-18 PROCEDURE — 76882 US LMTD JT/FCL EVL NVASC XTR: CPT

## 2025-01-07 ENCOUNTER — OFFICE VISIT (OUTPATIENT)
Dept: PRIMARY CARE CLINIC | Age: 45
End: 2025-01-07
Payer: COMMERCIAL

## 2025-01-07 VITALS
BODY MASS INDEX: 25.37 KG/M2 | HEART RATE: 81 BPM | WEIGHT: 162 LBS | SYSTOLIC BLOOD PRESSURE: 100 MMHG | OXYGEN SATURATION: 99 % | TEMPERATURE: 96.8 F | DIASTOLIC BLOOD PRESSURE: 62 MMHG

## 2025-01-07 DIAGNOSIS — F41.9 ANXIETY: Primary | ICD-10-CM

## 2025-01-07 PROCEDURE — 99213 OFFICE O/P EST LOW 20 MIN: CPT | Performed by: NURSE PRACTITIONER

## 2025-01-07 RX ORDER — ESCITALOPRAM OXALATE 10 MG/1
10 TABLET ORAL DAILY
Qty: 30 TABLET | Refills: 5 | Status: SHIPPED | OUTPATIENT
Start: 2025-01-07

## 2025-01-07 ASSESSMENT — PATIENT HEALTH QUESTIONNAIRE - PHQ9
3. TROUBLE FALLING OR STAYING ASLEEP: SEVERAL DAYS
2. FEELING DOWN, DEPRESSED OR HOPELESS: SEVERAL DAYS
10. IF YOU CHECKED OFF ANY PROBLEMS, HOW DIFFICULT HAVE THESE PROBLEMS MADE IT FOR YOU TO DO YOUR WORK, TAKE CARE OF THINGS AT HOME, OR GET ALONG WITH OTHER PEOPLE: SOMEWHAT DIFFICULT
SUM OF ALL RESPONSES TO PHQ QUESTIONS 1-9: 7
8. MOVING OR SPEAKING SO SLOWLY THAT OTHER PEOPLE COULD HAVE NOTICED. OR THE OPPOSITE, BEING SO FIGETY OR RESTLESS THAT YOU HAVE BEEN MOVING AROUND A LOT MORE THAN USUAL: SEVERAL DAYS
5. POOR APPETITE OR OVEREATING: NOT AT ALL
6. FEELING BAD ABOUT YOURSELF - OR THAT YOU ARE A FAILURE OR HAVE LET YOURSELF OR YOUR FAMILY DOWN: SEVERAL DAYS
1. LITTLE INTEREST OR PLEASURE IN DOING THINGS: SEVERAL DAYS
SUM OF ALL RESPONSES TO PHQ QUESTIONS 1-9: 7
SUM OF ALL RESPONSES TO PHQ9 QUESTIONS 1 & 2: 2
7. TROUBLE CONCENTRATING ON THINGS, SUCH AS READING THE NEWSPAPER OR WATCHING TELEVISION: SEVERAL DAYS
9. THOUGHTS THAT YOU WOULD BE BETTER OFF DEAD, OR OF HURTING YOURSELF: NOT AT ALL
4. FEELING TIRED OR HAVING LITTLE ENERGY: SEVERAL DAYS
SUM OF ALL RESPONSES TO PHQ QUESTIONS 1-9: 7
SUM OF ALL RESPONSES TO PHQ QUESTIONS 1-9: 7

## 2025-01-07 NOTE — PROGRESS NOTES
Name: Gi Adams  : 1980         Chief Complaint:     Chief Complaint   Patient presents with    Anxiety     -increased anxiety due to a combination of things  -would like to revisit the Lexapro  -she felt it worked well        History of Present Illness:      Gi Adams is a 44 y.o.  female who presents with Anxiety (-increased anxiety due to a combination of things/-would like to revisit the Lexapro/-she felt it worked well )      HPI    The patient presents with concerns of worsening anxiety due to a combination of things. She was previously on lexapro but self-discontinued due to sx improved. She states she has messed up at work and this has been causing anxiety. Admits ruminating on thoughts. Admits being \"in her own head\". She \"it feels like it is more anxiety than depression\". She states \"I am own worst enemy\". Denies thoughts of hurting self or others.     Past Medical History:     Past Medical History:   Diagnosis Date    Exercise-induced asthma       Reviewed all health maintenance requirements and ordered appropriate tests  Health Maintenance Due   Topic Date Due    Varicella vaccine (1 of 2 - 13+ 2-dose series) Never done    Hepatitis B vaccine (1 of 3 - 19+ 3-dose series) Never done    Pneumococcal 0-64 years Vaccine (2 of 2 - PCV) 2021    Flu vaccine (1) 2024    COVID-19 Vaccine (3 - - season) 2024    Cervical cancer screen  2024       Past Surgical History:     No past surgical history on file.     Medications:       Prior to Admission medications    Medication Sig Start Date End Date Taking? Authorizing Provider   escitalopram (LEXAPRO) 10 MG tablet Take 1 tablet by mouth daily 25  Yes Ashlyn Sr APRN - CNP   ibuprofen (ADVIL;MOTRIN) 800 MG tablet Take 1 tablet by mouth 2 times daily as needed for Pain (. Take with food.) 9/30/24 3/29/25 Yes sAhlyn Sr APRN - CNP   albuterol sulfate HFA (PROAIR HFA) 108 (90 Base) MCG/ACT

## 2025-02-12 ENCOUNTER — TELEPHONE (OUTPATIENT)
Dept: PRIMARY CARE CLINIC | Age: 45
End: 2025-02-12

## 2025-02-12 NOTE — TELEPHONE ENCOUNTER
Called pt. And stated she is doing good. Stated she feels 10mg is good for now. Pt. Stated she will let us know if her anxiety gets worse and will need an increase in dosage. Pt. Did ask if she need to make an appt or just message through [employer] when she feels she needs an increase in medication. Please advise

## 2025-02-12 NOTE — TELEPHONE ENCOUNTER
Left message to call the office  Left VM and made aware to call office if need an appt. In the future

## 2025-02-12 NOTE — TELEPHONE ENCOUNTER
Please call and see how patient is doing on lexapro. Does she prefer to stay on current dose or increase to 20mg?

## 2025-02-12 NOTE — TELEPHONE ENCOUNTER
Preferably patient can come in for an appt to be seen to discuss her worsening sx and instructions for dose increase, side effects, etc

## 2025-03-05 ENCOUNTER — E-VISIT (OUTPATIENT)
Dept: PRIMARY CARE CLINIC | Age: 45
End: 2025-03-05

## 2025-03-05 DIAGNOSIS — J01.90 ACUTE NON-RECURRENT SINUSITIS, UNSPECIFIED LOCATION: Primary | ICD-10-CM

## 2025-03-05 ASSESSMENT — LIFESTYLE VARIABLES: SMOKING_STATUS: NO, I'VE NEVER SMOKED

## 2025-03-05 NOTE — PROGRESS NOTES
Gi Adams (1980) initiated an asynchronous digital communication through [employer].    HPI: per patient questionnaire     Exam: not applicable    Diagnoses and all orders for this visit:  Diagnoses and all orders for this visit:    Acute non-recurrent sinusitis, unspecified location    Other orders  -     amoxicillin-clavulanate (AUGMENTIN) 875-125 MG per tablet; Take 1 tablet by mouth 2 times daily for 10 days    Sx for a week. Antibiotic sent. Supportive care. Fu with pcp as needed    Time: EV2 - 11-20 minutes were spent on the digital evaluation and management of this patient. 18 min     TODD Sun - CNP

## 2025-03-13 RX ORDER — ESCITALOPRAM OXALATE 10 MG/1
10 TABLET ORAL DAILY
Qty: 90 TABLET | Refills: 3 | Status: SHIPPED | OUTPATIENT
Start: 2025-03-13

## 2025-03-20 ENCOUNTER — OFFICE VISIT (OUTPATIENT)
Dept: PRIMARY CARE CLINIC | Age: 45
End: 2025-03-20
Payer: COMMERCIAL

## 2025-03-20 VITALS
WEIGHT: 166 LBS | DIASTOLIC BLOOD PRESSURE: 80 MMHG | OXYGEN SATURATION: 97 % | TEMPERATURE: 96.8 F | HEART RATE: 88 BPM | SYSTOLIC BLOOD PRESSURE: 110 MMHG | BODY MASS INDEX: 25.99 KG/M2

## 2025-03-20 DIAGNOSIS — J45.21 MILD INTERMITTENT ASTHMA WITH ACUTE EXACERBATION: ICD-10-CM

## 2025-03-20 DIAGNOSIS — R05.1 ACUTE COUGH: Primary | ICD-10-CM

## 2025-03-20 PROCEDURE — 99213 OFFICE O/P EST LOW 20 MIN: CPT | Performed by: NURSE PRACTITIONER

## 2025-03-20 RX ORDER — AZITHROMYCIN 250 MG/1
TABLET, FILM COATED ORAL
Qty: 6 TABLET | Refills: 0 | Status: SHIPPED | OUTPATIENT
Start: 2025-03-20

## 2025-03-20 RX ORDER — PREDNISONE 20 MG/1
20 TABLET ORAL 2 TIMES DAILY
Qty: 10 TABLET | Refills: 0 | Status: SHIPPED | OUTPATIENT
Start: 2025-03-20 | End: 2025-03-25

## 2025-03-20 SDOH — ECONOMIC STABILITY: FOOD INSECURITY: WITHIN THE PAST 12 MONTHS, YOU WORRIED THAT YOUR FOOD WOULD RUN OUT BEFORE YOU GOT MONEY TO BUY MORE.: NEVER TRUE

## 2025-03-20 SDOH — ECONOMIC STABILITY: FOOD INSECURITY: WITHIN THE PAST 12 MONTHS, THE FOOD YOU BOUGHT JUST DIDN'T LAST AND YOU DIDN'T HAVE MONEY TO GET MORE.: NEVER TRUE

## 2025-03-20 NOTE — PROGRESS NOTES
10/08/2024 07:11 AM     01/16/2012 11:40 AM    MPV 11.0 10/08/2024 07:11 AM     Lab Results   Component Value Date/Time    TSH 3.42 10/08/2024 07:10 AM     Lab Results   Component Value Date/Time    CHOL 248 10/08/2024 07:11 AM     10/08/2024 07:11 AM    HDL 74 10/08/2024 07:11 AM    LABA1C 5.3 10/08/2024 07:11 AM       Assessment/Plan:      Diagnosis Orders   1. Acute cough [R05.1]        2. Mild intermittent asthma with acute exacerbation  budesonide (PULMICORT FLEXHALER) 90 MCG/ACT AEPB inhaler        - Suspicion for pneumonia low.  No chest x-ray ordered at this time  - Discussed concern for bacterial URI with secondary asthma exacerbation  - Encouraged rest, hydration, warm tea with honey, humidifier use, OTC medication for symptom relief  - Azithromycin prescribed  - Prednisone oral burst twice daily x 5 days prescribed.  Counseled on common side effects  - Will trial budesonide inhaled corticosteroid.  Educated to rinse mouth out after use  - Notify office symptoms worsen or persist    -- Reviewed emergent signs and symptoms and when to seek care at the emergency department and/or call 911     Completed Refills   Requested Prescriptions     Signed Prescriptions Disp Refills    azithromycin (ZITHROMAX Z-IKE) 250 MG tablet 6 tablet 0     Sig: Take 2 tablets (500mg) by mouth once daily on day 1. Take 1 tablet (250mg) by mouth once daily on days 2 - 5.    predniSONE (DELTASONE) 20 MG tablet 10 tablet 0     Sig: Take 1 tablet by mouth 2 times daily for 5 days    budesonide (PULMICORT FLEXHALER) 90 MCG/ACT AEPB inhaler 1 each 2     Sig: Inhale 2 puffs into the lungs in the morning and 2 puffs in the evening.       No orders of the defined types were placed in this encounter.       No results found for this visit on 03/20/25.    Return if symptoms worsen or fail to improve.    Electronically signed by TODD Luevano CNP on 03/21/25 at 11:39 AM.

## 2025-03-24 RX ORDER — FLUTICASONE FUROATE 100 UG/1
POWDER RESPIRATORY (INHALATION)
Refills: 0 | OUTPATIENT
Start: 2025-03-24

## 2025-03-24 NOTE — TELEPHONE ENCOUNTER
Called to clarify with patient. She stated that she received a text message from [facility] that they needed to switch the medication to an alternative.

## 2025-07-07 RX ORDER — ESCITALOPRAM OXALATE 10 MG/1
10 TABLET ORAL DAILY
Qty: 90 TABLET | Refills: 3 | Status: SHIPPED | OUTPATIENT
Start: 2025-07-07